# Patient Record
Sex: MALE | Race: WHITE | Employment: FULL TIME | URBAN - METROPOLITAN AREA
[De-identification: names, ages, dates, MRNs, and addresses within clinical notes are randomized per-mention and may not be internally consistent; named-entity substitution may affect disease eponyms.]

---

## 2017-10-05 ENCOUNTER — OFFICE VISIT (OUTPATIENT)
Dept: INTERNAL MEDICINE CLINIC | Age: 52
End: 2017-10-05

## 2017-10-05 VITALS
HEIGHT: 72 IN | OXYGEN SATURATION: 98 % | SYSTOLIC BLOOD PRESSURE: 126 MMHG | WEIGHT: 290 LBS | DIASTOLIC BLOOD PRESSURE: 90 MMHG | HEART RATE: 76 BPM | BODY MASS INDEX: 39.28 KG/M2 | RESPIRATION RATE: 16 BRPM

## 2017-10-05 DIAGNOSIS — R53.82 CHRONIC FATIGUE: ICD-10-CM

## 2017-10-05 DIAGNOSIS — Z23 ENCOUNTER FOR IMMUNIZATION: ICD-10-CM

## 2017-10-05 DIAGNOSIS — Z12.5 PROSTATE CANCER SCREENING: Primary | ICD-10-CM

## 2017-10-05 DIAGNOSIS — F32.3 SEVERE MAJOR DEPRESSION WITH PSYCHOTIC FEATURES (HCC): ICD-10-CM

## 2017-10-05 DIAGNOSIS — Z12.11 COLON CANCER SCREENING: ICD-10-CM

## 2017-10-05 DIAGNOSIS — E66.9 OBESITY (BMI 35.0-39.9 WITHOUT COMORBIDITY): ICD-10-CM

## 2017-10-05 PROBLEM — Z82.49 FAMILY HISTORY OF HEART DISEASE: Status: ACTIVE | Noted: 2017-10-05

## 2017-10-05 RX ORDER — CLONAZEPAM 1 MG/1
0.5 TABLET ORAL AS NEEDED
COMMUNITY
End: 2019-06-06 | Stop reason: ALTCHOICE

## 2017-10-05 RX ORDER — QUETIAPINE FUMARATE 50 MG/1
50 TABLET, EXTENDED RELEASE ORAL
COMMUNITY
Start: 2017-09-21 | End: 2019-06-06 | Stop reason: ALTCHOICE

## 2017-10-05 NOTE — MR AVS SNAPSHOT
Visit Information Date & Time Provider Department Dept. Phone Encounter #  
 10/5/2017  2:15 PM Gisela Tomas, [address] [facility] [phone] [id number] Your Appointments 1/17/2018  9:20 AM  
COMPLETE 40 with Kaylen Gutierrez MD  
Formerly [facility] Appt Note: cpe; cpe  
 [place] [address] [facility] [address]  
[facility] [address] [facility] [address] [facility] [address] Upcoming Health Maintenance Date Due Pneumococcal 19-64 Medium Risk (1 of 1 - PPSV23) 10/1/1983 FOBT Q 1 YEAR AGE 50-75 10/1/2014 INFLUENZA AGE 9 TO ADULT 8/1/2017 DTaP/Tdap/Td series (2 - Td) 5/1/2023 Allergies as of 10/5/2017  Review Complete On: 10/5/2017 By: Natalie Rosas LPN No Known Allergies Current Immunizations  Reviewed on 10/26/2012 Name Date Influenza Vaccine 10/12/2016 Influenza Vaccine (Quad) PF  Incomplete Influenza Vaccine Split 10/26/2012 Tdap 5/1/2013 Not reviewed this visit You Were Diagnosed With   
  
 Codes Comments Prostate cancer screening    -  Primary ICD-10-CM: Z12.5 ICD-9-CM: V76.44 Chronic fatigue     ICD-10-CM: R53.82 
ICD-9-CM: 780.79 Obesity (BMI 35.0-39.9 without comorbidity)     ICD-10-CM: J64.4 ICD-9-CM: 278.00 Severe major depression with psychotic features ([facility] [address]     ICD-10-CM: F32.3 ICD-9-CM: 296.24 Colon cancer screening     ICD-10-CM: Z12.11 ICD-9-CM: V76.51 Encounter for immunization     ICD-10-CM: K78 ICD-9-CM: V03.89 Vitals BP Pulse Resp Height(growth percentile) Weight(growth percentile) SpO2  
 126/90 76 16 6' (1.829 m) 290 lb (131.5 kg) 98% BMI Smoking Status 39.33 kg/m2 Never Smoker Vitals History BMI and BSA Data Body Mass Index Body Surface Area  
 39.33 kg/m 2 2.58 m 2 Preferred Pharmacy Pharmacy Name Phone [id number] Priya Deluca, [address] [facility] [phone] Your Updated Medication List  
  
   
This list is accurate as of: 10/5/17  3:09 PM.  Always use your most recent med list.  
  
  
  
  
 * buPROPion  mg tablet Commonly known as:  Kajal Failing Take 300 mg by mouth every morning. * buPROPion  mg XL tablet Commonly known as:  Kajal Failing Indications: ANXIETY WITH DEPRESSION  
  
 clonazePAM 1 mg tablet Commonly known as:  Elta Halsted Take  by mouth two (2) times a day. QUEtiapine SR 50 mg sr tablet Commonly known as:  SEROquel XR  
  
 venlafaxine 225 mg Tr24 TAKE 1 TAB DAILY ,INSTR:PLEASE ASK PT TO CALL TO SCHEDULE FOLLOW-UP. * Notice: This list has 2 medication(s) that are the same as other medications prescribed for you. Read the directions carefully, and ask your doctor or other care provider to review them with you. We Performed the Following CBC WITH AUTOMATED DIFF [17607 CPT(R)] INFLUENZA VIRUS VAC QUAD,SPLIT,PRESV FREE SYRINGE IM [id number] CPT(R)] LIPID PANEL [28668 CPT(R)] METABOLIC PANEL, COMPREHENSIVE [28405 CPT(R)] PSA SCREENING (SCREENING) [ [facility]] REFERRAL TO GASTROENTEROLOGY [YQN92 Custom] REFERRAL TO SLEEP STUDIES [REF99 Custom] TSH 3RD GENERATION [97502 CPT(R)] Referral Information Referral ID Referred By Referred To  
  
 [id number] Gasper Gibbs MD   
   [address] [place] [address] Phone: [phone] Fax: [phone] Visits Status Start Date End Date 1 New Request 10/5/17 10/5/18 If your referral has a status of pending review or denied, additional information will be sent to support the outcome of this decision. Referral ID Referred By Referred To  
 [id number] KELLY, [id number] [facility]  
   [address] [facility] [address], [address] Visits Status Start Date End Date 1 New Request 10/5/17 10/5/18 If your referral has a status of pending review or denied, additional information will be sent to support the outcome of this decision. Introducing [facility]! Dear Jabari Rudd: Thank you for requesting a [employer] account. Our records indicate that you already have an active [employer] account. You can access your account anytime at [email or web address] Did you know that you can access your hospital and ER discharge instructions at any time in [employer]? You can also review all of your test results from your hospital stay or ER visit. Additional Information If you have questions, please visit the Frequently Asked Questions section of the [employer] website at [email or web address] Remember, [employer] is NOT to be used for urgent needs. For medical emergencies, dial 911. Now available from your iPhone and Android! Please provide this summary of care documentation to your next provider. Your primary care clinician is listed as LIT CRANDALL. If you have any questions after today's visit, please call [phone].

## 2017-10-05 NOTE — PROGRESS NOTES
Reviewed record in preparation for visit and have obtained necessary documentation. Coordination of Care Questions    1. Have you been to the ER, urgent care clinic since your last visit? No       Hospitalized since your last visit? No    2. Have you seen or consulted any other health care providers outside of the [address] [place] [address] since your last visit? Include any pap smears or colon screening.  Yes

## 2017-10-05 NOTE — PROGRESS NOTES
No chief complaint on file. Nany Ramos is here for complete health maintenance physical exam and screening. he does have other concerns. Fatigue no energy  This is a 47year-old white male who is usually a patient of Dr. Damaris Shoemaker. The patient's wife asked if I would see him. He does not seem to communicate well with female physicians, so she wanted me to see him as a male physician. He really did not complain a lot and you had to get a lot out of him. The wife notes that he has had this very serious depression. He has had several hospital stays. He has been on high dose medication. He is now seeing a [facility] psychiatrist, who has been tapering his Seroquel. He still has no energy. He sleeps a lot and is tired a lot during the day. He denies insomnia. He says he goes to bed around 11:00 and wakes up early. He does feel tired during the day, but he does not really nap and he does not fall asleep while driving. He is not sure if he snores. He has gained 30 pounds over the last two to three years due to probably poor diet and inactivity. He has a family history of heart disease and with his obesity we are concerned about his cardiac risks. Health maintenance hx includes:  Exercise: not active.   Form of exercise: not much   Diet: not attempting to follow a low fat, low cholesterol diet, not attempting to follow a low sodium diet, nonsmoker, alcohol intake minimal  unemployed  Cancer screening:    Colon cancer screening:  Last Colonoscopy: never Prostate cancer screening: PSA and/or RUBIO:   2014  Lab Results   Component Value Date/Time    Prostate Specific Ag 0.5 11/14/2014 08:05 AM          Lab Results   Component Value Date/Time    Cholesterol, total 154 10/21/2016 01:26 PM    HDL Cholesterol 51 10/21/2016 01:26 PM    LDL, calculated 85 10/21/2016 01:26 PM    VLDL, calculated 18 10/21/2016 01:26 PM    Triglyceride 91 10/21/2016 01:26 PM    CHOL/HDL Ratio 3.0 04/18/2014 04:52 AM       Lab Results   Component Value Date/Time    Glucose 89 10/21/2016 01:26 PM    Glucose 109 04/18/2014 04:52 AM         Immunizations:     Immunization History   Administered Date(s) Administered    Influenza Vaccine 10/12/2016    Influenza Vaccine (Quad) PF 10/05/2017    Influenza Vaccine Split 10/26/2012    Tdap 05/01/2013      Immunization status: up to date and documented. Reviewed zostavax age 60     Social History     Social History    Marital status:      Spouse name: N/A    Number of children: N/A    Years of education: N/A     Occupational History    Not on file. Social History Main Topics    Smoking status: Never Smoker    Smokeless tobacco: Never Used    Alcohol use 1.0 oz/week     2 Standard drinks or equivalent per week    Drug use: No    Sexual activity: Yes     Partners: Female     Other Topics Concern    Not on file     Social History Narrative     Past Surgical History:   Procedure Laterality Date    HX HEENT  1990's    right ear explor. secondary to hearing loss     Family History   Problem Relation Age of Onset    Bipolar Disorder Brother      Current Outpatient Prescriptions on File Prior to Visit   Medication Sig Dispense Refill    buPROPion XL (WELLBUTRIN XL) 300 mg XL tablet Indications: ANXIETY WITH DEPRESSION      venlafaxine 225 mg tr24 TAKE 1 TAB DAILY ,INSTR:PLEASE ASK PT TO CALL TO SCHEDULE FOLLOW-UP.  2    buPROPion XL (WELLBUTRIN XL) 150 mg tablet Take 300 mg by mouth every morning. No current facility-administered medications on file prior to visit. .  Vitals:    10/05/17 1435 10/05/17 1450   BP: 142/90 126/90   Pulse: 76    Resp: 16    SpO2: 98%    Weight: 290 lb (131.5 kg)    Height: 6' (1.829 m)      The physical exam is generally normal. He appears withdrawn and oriented x 3, pleasant and cooperative. Vitals as noted. ENT normal, neck supple and free of adenopathy, or masses. No thyromegaly or carotid bruits.  Cranial nerves and fundi normal. Lungs are clear to auscultation. Heart sounds are normal, no murmurs, clicks, gallops or rubs. Abdomen is soft, no tenderness, masses or organomegaly. Extremities, peripheral pulses and reflexes are normal.  . Rectal: PROSTATE EXAM: smooth and symmetric without nodules or tenderness, difficult to tell. . Skin is normal without rashes or suspicious lesions. Diagnoses and all orders for this visit:    1. Prostate cancer screening  -     PSA SCREENING (SCREENING) ()  -     CBC WITH AUTOMATED DIFF  -     LIPID PANEL  -     METABOLIC PANEL, COMPREHENSIVE  -     TSH 3RD GENERATION    2. Chronic fatigue  -     REFERRAL TO SLEEP STUDIES    3. Obesity (BMI 35.0-39.9 without comorbidity)    4. Severe major depression with psychotic features ([facility] [address]    5. Colon cancer screening  -     [facility]    6. Encounter for immunization  -     Influenza virus vaccine (QUADRIVALENT PRES FREE SYRINGE) IM ([id number])              1. Chronic fatigue  Drug related likely and depression related  - REFERRAL TO SLEEP STUDIES    2. Obesity (BMI 35.0-39.9 without comorbidity)  Strong advise diet exercise  DASH diet advised and information given for lifestyle adjustment  May be able to lower BP 10 points with this diet      3. Prostate cancer screening    - PSA SCREENING (SCREENING) ()  - CBC WITH AUTOMATED DIFF  - LIPID PANEL  - METABOLIC PANEL, COMPREHENSIVE  - TSH 3RD GENERATION    4. Severe major depression with psychotic features [facility])  Per psych    5. Colon cancer screening  Needs to do  - [facility]    6. Encounter for immunization    - Influenza virus vaccine (QUADRIVALENT PRES FREE SYRINGE) IM ([id number])        Hypertension (elevated blood pressure)  - My Recommendations  -Purchase a blood pressure cuff that goes around your upper arm and check blood pressure at least 3 times per week. Write down your numbers for review. Check blood pressure in the morning and evening.   Rest for 5 minutes with feet elevated and arm resting on a table (at mid-chest level) when checking blood pressure  -Exercise 30-60 minutes most days of the week, preferably with a mix of cardiovascular and strength training. Exercise can improve blood pressure, even if you do not lose weight!  -Limit alcohol intake to less than 2-3 drinks daily   -Avoid tobacco products  -Avoid caffeine, energy drinks, stimulants  -DASH diet - includes fruits, vegetables, fiber, and less than 2000 mg sodium (salt) daily. Try to eat less than 6716-9698 calories daily.    Limit fat intake.    -Avoid non-steroidal inflammatory medications (NSAIDS) such as ibuprofen, advil, motrin, aleve, and naproxyn as these may increase blood pressure if used long-term  -Avoid OTC decongestants such as pseudopherine, phenylephrine, Afrin

## 2017-12-29 LAB
ALBUMIN SERPL-MCNC: 4.5 G/DL (ref 3.5–5.5)
ALBUMIN/GLOB SERPL: 1.5 {RATIO} (ref 1.2–2.2)
ALP SERPL-CCNC: 68 IU/L (ref 39–117)
ALT SERPL-CCNC: 52 IU/L (ref 0–44)
AST SERPL-CCNC: 32 IU/L (ref 0–40)
BASOPHILS # BLD AUTO: 0 X10E3/UL (ref 0–0.2)
BASOPHILS NFR BLD AUTO: 0 %
BILIRUB SERPL-MCNC: 0.6 MG/DL (ref 0–1.2)
BUN SERPL-MCNC: 10 MG/DL (ref 6–24)
BUN/CREAT SERPL: 11 (ref 9–20)
CALCIUM SERPL-MCNC: 9.5 MG/DL (ref 8.7–10.2)
CHLORIDE SERPL-SCNC: 102 MMOL/L (ref 96–106)
CHOLEST SERPL-MCNC: 126 MG/DL (ref 100–199)
CO2 SERPL-SCNC: 27 MMOL/L (ref 18–29)
CREAT SERPL-MCNC: 0.88 MG/DL (ref 0.76–1.27)
EOSINOPHIL # BLD AUTO: 0.1 X10E3/UL (ref 0–0.4)
EOSINOPHIL NFR BLD AUTO: 3 %
ERYTHROCYTE [DISTWIDTH] IN BLOOD BY AUTOMATED COUNT: 14.4 % (ref 12.3–15.4)
GLOBULIN SER CALC-MCNC: 3 G/DL (ref 1.5–4.5)
GLUCOSE SERPL-MCNC: 101 MG/DL (ref 65–99)
HCT VFR BLD AUTO: 45 % (ref 37.5–51)
HDLC SERPL-MCNC: 42 MG/DL
HGB BLD-MCNC: 15.8 G/DL (ref 13–17.7)
IMM GRANULOCYTES # BLD: 0 X10E3/UL (ref 0–0.1)
IMM GRANULOCYTES NFR BLD: 0 %
INTERPRETATION, 910389: NORMAL
LDLC SERPL CALC-MCNC: 69 MG/DL (ref 0–99)
LYMPHOCYTES # BLD AUTO: 2.1 X10E3/UL (ref 0.7–3.1)
LYMPHOCYTES NFR BLD AUTO: 40 %
MCH RBC QN AUTO: 32.6 PG (ref 26.6–33)
MCHC RBC AUTO-ENTMCNC: 35.1 G/DL (ref 31.5–35.7)
MCV RBC AUTO: 93 FL (ref 79–97)
MONOCYTES # BLD AUTO: 0.5 X10E3/UL (ref 0.1–0.9)
MONOCYTES NFR BLD AUTO: 10 %
NEUTROPHILS # BLD AUTO: 2.4 X10E3/UL (ref 1.4–7)
NEUTROPHILS NFR BLD AUTO: 47 %
PLATELET # BLD AUTO: 210 X10E3/UL (ref 150–379)
POTASSIUM SERPL-SCNC: 4.4 MMOL/L (ref 3.5–5.2)
PROT SERPL-MCNC: 7.5 G/DL (ref 6–8.5)
PSA SERPL-MCNC: 0.4 NG/ML (ref 0–4)
RBC # BLD AUTO: 4.85 X10E6/UL (ref 4.14–5.8)
SODIUM SERPL-SCNC: 143 MMOL/L (ref 134–144)
TRIGL SERPL-MCNC: 74 MG/DL (ref 0–149)
TSH SERPL DL<=0.005 MIU/L-ACNC: 1.26 UIU/ML (ref 0.45–4.5)
VLDLC SERPL CALC-MCNC: 15 MG/DL (ref 5–40)
WBC # BLD AUTO: 5.2 X10E3/UL (ref 3.4–10.8)

## 2018-01-04 NOTE — PROGRESS NOTES
Writer spoke with patient and informed him Per Dr Robe Correia slight elevation liver enzymes most likely related to weight and fatty liver, stable otherwise.  Patient states understanding

## 2018-12-17 ENCOUNTER — OFFICE VISIT (OUTPATIENT)
Dept: INTERNAL MEDICINE CLINIC | Age: 53
End: 2018-12-17

## 2018-12-17 VITALS
TEMPERATURE: 97 F | WEIGHT: 303.6 LBS | HEART RATE: 73 BPM | DIASTOLIC BLOOD PRESSURE: 90 MMHG | RESPIRATION RATE: 18 BRPM | SYSTOLIC BLOOD PRESSURE: 135 MMHG | BODY MASS INDEX: 41.12 KG/M2 | HEIGHT: 72 IN | OXYGEN SATURATION: 98 %

## 2018-12-17 DIAGNOSIS — I10 HTN, GOAL BELOW 130/80: ICD-10-CM

## 2018-12-17 DIAGNOSIS — R53.83 FATIGUE DUE TO DEPRESSION: ICD-10-CM

## 2018-12-17 DIAGNOSIS — F32.A FATIGUE DUE TO DEPRESSION: ICD-10-CM

## 2018-12-17 DIAGNOSIS — Z82.49 FAMILY HISTORY OF HEART DISEASE: ICD-10-CM

## 2018-12-17 DIAGNOSIS — F32.3 SEVERE MAJOR DEPRESSION WITH PSYCHOTIC FEATURES (HCC): Primary | ICD-10-CM

## 2018-12-17 DIAGNOSIS — Z12.5 ENCOUNTER FOR PROSTATE CANCER SCREENING: ICD-10-CM

## 2018-12-17 DIAGNOSIS — E66.9 OBESITY (BMI 35.0-39.9 WITHOUT COMORBIDITY): ICD-10-CM

## 2018-12-17 PROBLEM — E66.01 OBESITY, MORBID (HCC): Status: ACTIVE | Noted: 2018-12-17

## 2018-12-17 NOTE — PROGRESS NOTES
1. Have you been to the ER, urgent care clinic since your last visit? Hospitalized since your last visit?yes. Admitted for Depression- [facility]    2. Have you seen or consulted any other health care providers outside of the [address] [place] [address] since your last visit? Include any pap smears or colon screening.   Dr. Susan Edwards through [facility]

## 2018-12-17 NOTE — PROGRESS NOTES
Chief Complaint   Patient presents with    Complete Physical     Chief Complaint   Patient presents with    Complete Physical     Jarred LUIS Veliz is here for complete health maintenance physical exam and screening. he does have other concerns. Fatigue no energy  This is a 47year-old white male who is usually a patient of Dr. Silvio Rider. The patient's wife asked if I would see him. He does not seem to communicate well with female physicians, so she wanted me to see him as a male physician. He really did not complain a lot and you had to get a lot out of him. .   Was hospitalized in 2/2018    After stopping medsHe still has no energy. He sleeps a lot and is tired a lot during the day. He denies insomnia. He says he goes to bed around 11:00 and wakes up early. He does feel tired during the day, but he does not really nap and he does not fall asleep while driving. He is not sure if he snores. He has gained 30 pounds over the last two to three years due to probably poor diet and inactivity. He has a family history of heart disease and with his obesity we are concerned about his cardiac risks. Health maintenance hx includes:  Exercise: not active.   Form of exercise: not much   Diet: not attempting to follow a low fat, low cholesterol diet, not attempting to follow a low sodium diet, nonsmoker, alcohol intake minimal  unemployed  Cancer screening:    Colon cancer screening:  Last Colonoscopy:2018 Prostate cancer screening: PSA   Lab Results   Component Value Date/Time    Prostate Specific Ag 0.4 12/28/2017 08:21 AM    Prostate Specific Ag 0.5 11/14/2014 08:05 AM          Lab Results   Component Value Date/Time    Cholesterol, total 126 12/28/2017 08:21 AM    HDL Cholesterol 42 12/28/2017 08:21 AM    LDL, calculated 69 12/28/2017 08:21 AM    VLDL, calculated 15 12/28/2017 08:21 AM    Triglyceride 74 12/28/2017 08:21 AM    CHOL/HDL Ratio 3.0 04/18/2014 04:52 AM       Lab Results   Component Value Date/Time    Glucose 101 (H) 12/28/2017 08:21 AM    Glucose 109 (H) 04/18/2014 04:52 AM         Immunizations:     Immunization History   Administered Date(s) Administered    Influenza Vaccine 10/12/2016    Influenza Vaccine (Quad) PF 10/05/2017    Influenza Vaccine Split 10/26/2012    Tdap 05/01/2013      Immunization status: up to date and documented. worried low T noted  on present drugs testosterone always low and with obesity is low     Social History     Socioeconomic History    Marital status:      Spouse name: Not on file    Number of children: Not on file    Years of education: Not on file    Highest education level: Not on file   Social Needs    Financial resource strain: Not on file    Food insecurity - worry: Not on file    Food insecurity - inability: Not on file   [employer] needs - medical: Not on file   [employer] needs - non-medical: Not on file   Occupational History    Not on file   Tobacco Use    Smoking status: Never Smoker    Smokeless tobacco: Never Used   Substance and Sexual Activity    Alcohol use: Yes     Alcohol/week: 1.0 oz     Types: 2 Standard drinks or equivalent per week    Drug use: No    Sexual activity: Yes     Partners: Female   Other Topics Concern    Not on file   Social History Narrative    Not on file     Past Surgical History:   Procedure Laterality Date    HX HEENT  1990's    right ear explor. secondary to hearing loss     Family History   Problem Relation Age of Onset    Bipolar Disorder Brother      Current Outpatient Medications on File Prior to Visit   Medication Sig Dispense Refill    clonazePAM (KLONOPIN) 1 mg tablet Take 0.5 mg by mouth as needed.  QUEtiapine SR (SEROQUEL XR) 50 mg sr tablet Take 50 mg by mouth nightly.       buPROPion XL (WELLBUTRIN XL) 300 mg XL tablet Indications: ANXIETY WITH DEPRESSION      venlafaxine 225 mg tr24 TAKE 1 TAB DAILY ,INSTR:PLEASE ASK PT TO CALL TO SCHEDULE FOLLOW-UP.  2    buPROPion XL (WELLBUTRIN XL) 150 mg tablet Take 300 mg by mouth every morning. No current facility-administered medications on file prior to visit. .  Vitals:    12/17/18 1327 12/17/18 1354   BP: (!) 151/98 135/90   Pulse: 73    Resp: 18    Temp: 97 °F (36.1 °C)    TempSrc: Oral    SpO2: 98%    Weight: 303 lb 9.6 oz (137.7 kg)    Height: 6' (1.829 m)      The physical exam is generally normal. He appears withdrawn and oriented x 3, pleasant and cooperative. Vitals as noted. ENT normal, neck supple and free of adenopathy, or masses. No thyromegaly or carotid bruits. Cranial nerves and fundi normal. Lungs are clear to auscultation. Heart sounds are normal, no murmurs, clicks, gallops or rubs. Abdomen is soft, no tenderness, masses or organomegaly. Extremities, peripheral pulses and reflexes are normal.  . Rectal:   Deferred due to his size  Skin is normal without rashes or suspicious lesions. Diagnoses and all orders for this visit:    1. Severe major depression with psychotic features ([facility] [address]    2. Encounter for prostate cancer screening  -     PSA, DIAGNOSTIC (PROSTATE SPECIFIC AG)    3. Obesity (BMI 35.0-39.9 without comorbidity)  -     TSH 3RD GENERATION  -     SLEEP MEDICINE REFERRAL    4. Family history of heart disease    5. HTN, goal below 130/80  -     CBC WITH AUTOMATED DIFF  -     LIPID PANEL  -     METABOLIC PANEL, COMPREHENSIVE  -     SLEEP MEDICINE REFERRAL    6. Fatigue due to depression              1. Chronic fatigue  Drug related likely and depression related  - REFERRAL TO SLEEP STUDIES    2. Obesity (BMI 35.0-39.9 without comorbidity)  Strong advise diet exercise  DASH diet advised and information given for lifestyle adjustment  May be able to lower BP 10 points with this diet      3. Prostate cancer screening    - PSA SCREENING (SCREENING) ()  - CBC WITH AUTOMATED DIFF  - LIPID PANEL  - METABOLIC PANEL, COMPREHENSIVE  - TSH 3RD GENERATION    4.  Severe major depression with psychotic features [facility])  Per psych    6. Encounter for immunization    - Influenza virus vaccine (QUADRIVALENT PRES FREE SYRINGE) IM ([id number])      -Limit alcohol intake to less than 2-3 drinks daily   -Avoid tobacco products  -Avoid caffeine, energy drinks, stimulants  -DASH diet - includes fruits, vegetables, fiber, and less than 2000 mg sodium (salt) daily. Try to eat less than 7737-8955 calories daily.    Limit fat intake.    -Avoid non-steroidal inflammatory medications (NSAIDS) such as ibuprofen, advil, motrin, aleve, and naproxyn as these may increase blood pressure if used long-term  -Avoid OTC decongestants such as pseudopherine, phenylephrine, Afrin      RX for shingrix given  See 6 month on BP  After evfal for apnea

## 2018-12-18 LAB
ALBUMIN SERPL-MCNC: 4.6 G/DL (ref 3.5–5.5)
ALBUMIN/GLOB SERPL: 1.5 {RATIO} (ref 1.2–2.2)
ALP SERPL-CCNC: 88 IU/L (ref 39–117)
ALT SERPL-CCNC: 95 IU/L (ref 0–44)
AST SERPL-CCNC: 49 IU/L (ref 0–40)
BASOPHILS # BLD AUTO: 0.1 X10E3/UL (ref 0–0.2)
BASOPHILS NFR BLD AUTO: 1 %
BILIRUB SERPL-MCNC: 0.4 MG/DL (ref 0–1.2)
BUN SERPL-MCNC: 9 MG/DL (ref 6–24)
BUN/CREAT SERPL: 10 (ref 9–20)
CALCIUM SERPL-MCNC: 9.3 MG/DL (ref 8.7–10.2)
CHLORIDE SERPL-SCNC: 103 MMOL/L (ref 96–106)
CHOLEST SERPL-MCNC: 147 MG/DL (ref 100–199)
CO2 SERPL-SCNC: 24 MMOL/L (ref 20–29)
CREAT SERPL-MCNC: 0.93 MG/DL (ref 0.76–1.27)
EOSINOPHIL # BLD AUTO: 0.1 X10E3/UL (ref 0–0.4)
EOSINOPHIL NFR BLD AUTO: 2 %
ERYTHROCYTE [DISTWIDTH] IN BLOOD BY AUTOMATED COUNT: 13.5 % (ref 12.3–15.4)
GLOBULIN SER CALC-MCNC: 3 G/DL (ref 1.5–4.5)
GLUCOSE SERPL-MCNC: 153 MG/DL (ref 65–99)
HCT VFR BLD AUTO: 43 % (ref 37.5–51)
HDLC SERPL-MCNC: 45 MG/DL
HGB BLD-MCNC: 14.9 G/DL (ref 13–17.7)
IMM GRANULOCYTES # BLD: 0 X10E3/UL (ref 0–0.1)
IMM GRANULOCYTES NFR BLD: 0 %
INTERPRETATION, 910389: NORMAL
LDLC SERPL CALC-MCNC: 88 MG/DL (ref 0–99)
LYMPHOCYTES # BLD AUTO: 2.1 X10E3/UL (ref 0.7–3.1)
LYMPHOCYTES NFR BLD AUTO: 37 %
MCH RBC QN AUTO: 32.5 PG (ref 26.6–33)
MCHC RBC AUTO-ENTMCNC: 34.7 G/DL (ref 31.5–35.7)
MCV RBC AUTO: 94 FL (ref 79–97)
MONOCYTES # BLD AUTO: 0.6 X10E3/UL (ref 0.1–0.9)
MONOCYTES NFR BLD AUTO: 10 %
NEUTROPHILS # BLD AUTO: 2.8 X10E3/UL (ref 1.4–7)
NEUTROPHILS NFR BLD AUTO: 50 %
PLATELET # BLD AUTO: 208 X10E3/UL (ref 150–379)
POTASSIUM SERPL-SCNC: 4.5 MMOL/L (ref 3.5–5.2)
PROT SERPL-MCNC: 7.6 G/DL (ref 6–8.5)
PSA SERPL-MCNC: 0.4 NG/ML (ref 0–4)
RBC # BLD AUTO: 4.58 X10E6/UL (ref 4.14–5.8)
SODIUM SERPL-SCNC: 141 MMOL/L (ref 134–144)
TRIGL SERPL-MCNC: 72 MG/DL (ref 0–149)
TSH SERPL DL<=0.005 MIU/L-ACNC: 0.76 UIU/ML (ref 0.45–4.5)
VLDLC SERPL CALC-MCNC: 14 MG/DL (ref 5–40)
WBC # BLD AUTO: 5.7 X10E3/UL (ref 3.4–10.8)

## 2018-12-21 DIAGNOSIS — R94.5 ABNORMAL LIVER FUNCTION: Primary | ICD-10-CM

## 2018-12-21 NOTE — PROGRESS NOTES
Abnormal liver enzymes likely fatty liver    However I will order a n ultrasound which he needs to do

## 2019-01-14 ENCOUNTER — HOSPITAL ENCOUNTER (OUTPATIENT)
Dept: ULTRASOUND IMAGING | Age: 54
Discharge: HOME | End: 2019-01-14
Attending: INTERNAL MEDICINE
Payer: COMMERCIAL

## 2019-01-14 DIAGNOSIS — R94.5 ABNORMAL LIVER FUNCTION: ICD-10-CM

## 2019-01-14 PROCEDURE — 76705 ECHO EXAM OF ABDOMEN: CPT

## 2019-01-17 ENCOUNTER — OFFICE VISIT (OUTPATIENT)
Dept: PRIMARY CARE CLINIC | Age: 54
End: 2019-01-17

## 2019-01-17 VITALS
OXYGEN SATURATION: 99 % | RESPIRATION RATE: 20 BRPM | SYSTOLIC BLOOD PRESSURE: 136 MMHG | HEIGHT: 72 IN | HEART RATE: 69 BPM | BODY MASS INDEX: 40.12 KG/M2 | DIASTOLIC BLOOD PRESSURE: 86 MMHG | WEIGHT: 296.2 LBS | TEMPERATURE: 98 F

## 2019-01-17 DIAGNOSIS — E66.01 MORBIDLY OBESE (HCC): ICD-10-CM

## 2019-01-17 DIAGNOSIS — F51.01 PRIMARY INSOMNIA: ICD-10-CM

## 2019-01-17 DIAGNOSIS — R73.01 ELEVATED FASTING BLOOD SUGAR: ICD-10-CM

## 2019-01-17 DIAGNOSIS — F32.3 SEVERE MAJOR DEPRESSION WITH PSYCHOTIC FEATURES (HCC): ICD-10-CM

## 2019-01-17 DIAGNOSIS — R06.83 SNORES: ICD-10-CM

## 2019-01-17 DIAGNOSIS — E11.9 NEW ONSET TYPE 2 DIABETES MELLITUS (HCC): ICD-10-CM

## 2019-01-17 DIAGNOSIS — K76.0 HEPATIC STEATOSIS: Primary | ICD-10-CM

## 2019-01-17 PROBLEM — E66.9 OBESITY (BMI 35.0-39.9 WITHOUT COMORBIDITY): Status: RESOLVED | Noted: 2017-10-05 | Resolved: 2019-01-17

## 2019-01-17 LAB — HBA1C MFR BLD HPLC: 7.2 %

## 2019-01-17 RX ORDER — METFORMIN HYDROCHLORIDE 500 MG/1
500 TABLET ORAL 2 TIMES DAILY WITH MEALS
Qty: 180 TAB | Refills: 0 | Status: SHIPPED | OUTPATIENT
Start: 2019-01-17 | End: 2019-04-16 | Stop reason: SDUPTHER

## 2019-01-17 RX ORDER — DULOXETIN HYDROCHLORIDE 60 MG/1
60 CAPSULE, DELAYED RELEASE ORAL DAILY
COMMUNITY

## 2019-01-17 NOTE — PROGRESS NOTES
Visit Vitals /88 (BP 1 Location: Left arm, BP Patient Position: Sitting) Pulse 69 Temp 98 °F (36.7 °C) (Oral) Resp 20 Ht 6' (1.829 m) Wt 296 lb 3.2 oz (134.4 kg) SpO2 99% BMI 40.17 kg/m² Chief Complaint Patient presents with Bob Ban Establish Care Changing PCP d/t accessability  Labs Would like to dicuss recent labs, and dx of Fatty Liver Disease  Leg Swelling Moderate  Nail Problem R foot only 1. Have you been to the ER, urgent care clinic since your last visit? Hospitalized since your last visit? Denies 2. Have you seen or consulted any other health care providers outside of the [address] [place] [address] since your last visit? Include any pap smears or colon screening. Denies

## 2019-01-17 NOTE — PROGRESS NOTES
Written by Pauline Martin, as dictated by Dr. Alek Medley MD. 
[address] [place] [address] Mavis Jay is a 46 y.o. male. HPI The patient comes in today to establish care and to discuss labs and imaging which were ordered by Dr. Diana Cunha (), his previous PCP. Labs were draw on 12/17: Glucose was high at 153, AST high at 49, and ALT high at 95. CBC, lipid panel, PSA, and TSH were normal. Patient notes that his triglycerides are normally high, but he has been eating healthy and is not on cholesterol medication. Patent notes that he ate during the day before his labs were drawn. He has no HX of diabetes or fatty liver disease. The patient believes his liver enzymes were high when he was in his 25s, but he notes that they were only high once. US ABD on 01/14 found: 1. Liver is diffusely increased in echogenicity which limits penetration with 2 small areas of focal sparing in the gallbladder fossa and findings may represent hepatic steatosis. There is no evidence of gallstones or ductal dilatation. He sees Dr. Rebeca Tidwell (psychiatry) and is taking venlafaxine 225 mg, Wellbutrin  mg, Seroquel  mg, Cymbalta 60 mg, and Klonopin 1 mg for depression. His Seroquel dosage is being decreased and he denies issues with this. The patient takes Klonopin as needed, but notes that he has not been taking it recently. He does not sleep well at night and has been told that he snores. Patient sometimes has headaches in the morning. He was told to have a sleep study, but did not go. He weighs 296 lbs today and has lost weight from 303 lbs on 12/17. Patient has been trying to lose weight. The patient notes that he eats pasta about once per week, pizza, and bread with most meals. BP is high today at 142/88, 136/86 on repeat. Denies HX of HTN and does not check his BP at home. He does not have a BP monitor at home. Patient has noticed BL leg swelling, but states that it is not bad today. He notes that the swelling is worse in the R leg than the L. The patient notes that he sits a lot and the swelling does not seem to be related to standing. Patient notes that he had asthma as a child, but grew out of it. He has some allergies, noting that he is especially allergic to cat dander and horses. He takes OTC Claritin. Denies constipation, diarrhea. Patient Active Problem List  
Diagnosis Code  Allergic rhinitis J30.9  Severe major depression with psychotic features ([facility]) F32.3  Family history of heart disease Z82.49  
 Obesity, morbid ([facility] [address] E66.01  
  
 
Current Outpatient Medications on File Prior to Visit Medication Sig Dispense Refill  DULoxetine (CYMBALTA) 60 mg capsule Take 60 mg by mouth daily.  clonazePAM (KLONOPIN) 1 mg tablet Take 0.5 mg by mouth as needed.  QUEtiapine SR (SEROQUEL XR) 50 mg sr tablet Take 50 mg by mouth nightly.  buPROPion XL (WELLBUTRIN XL) 150 mg tablet Take 300 mg by mouth every morning.  buPROPion XL (WELLBUTRIN XL) 300 mg XL tablet Indications: ANXIETY WITH DEPRESSION No current facility-administered medications on file prior to visit. Past Medical History:  
Diagnosis Date  Allergic rhinitis  Asthma   
 childhood & as adult allergic  Hearing loss in right ear   
 (30%) secondary to cyst  
 Narcolepsy 2008 Past Surgical History:  
Procedure Laterality Date  HX HEENT  1990's  
 right ear explor. secondary to hearing loss Family History Problem Relation Age of Onset  Bipolar Disorder Brother Social History Socioeconomic History  Marital status:  Spouse name: Not on file  Number of children: Not on file  Years of education: Not on file  Highest education level: Not on file Social Needs  Financial resource strain: Not on file  Food insecurity - worry: Not on file  Food insecurity - inability: Not on file  Transportation needs - medical: Not on file  Transportation needs - non-medical: Not on file Occupational History  Not on file Tobacco Use  Smoking status: Never Smoker  Smokeless tobacco: Never Used Substance and Sexual Activity  Alcohol use: Yes Alcohol/week: 1.0 oz Types: 2 Standard drinks or equivalent per week  Drug use: No  
 Sexual activity: Yes  
  Partners: Female Other Topics Concern  Not on file Social History Narrative  Not on file Office Visit on 12/17/2018 Component Date Value Ref Range Status  WBC 12/17/2018 5.7  3.4 - 10.8 x10E3/uL Final  
 RBC 12/17/2018 4.58  4.14 - 5.80 x10E6/uL Final  
 HGB 12/17/2018 14.9  13.0 - 17.7 g/dL Final  
 HCT 12/17/2018 43.0  37.5 - 51.0 % Final  
 MCV 12/17/2018 94  79 - 97 fL Final  
 MCH 12/17/2018 32.5  26.6 - 33.0 pg Final  
 MCHC 12/17/2018 34.7  31.5 - 35.7 g/dL Final  
 RDW 12/17/2018 13.5  12.3 - 15.4 % Final  
 PLATELET 81/28/7547 723  150 - 379 x10E3/uL Final  
 NEUTROPHILS 12/17/2018 50  Not Estab. % Final  
 Lymphocytes 12/17/2018 37  Not Estab. % Final  
 MONOCYTES 12/17/2018 10  Not Estab. % Final  
 EOSINOPHILS 12/17/2018 2  Not Estab. % Final  
 BASOPHILS 12/17/2018 1  Not Estab. % Final  
 ABS. NEUTROPHILS 12/17/2018 2.8  1.4 - 7.0 x10E3/uL Final  
 Abs Lymphocytes 12/17/2018 2.1  0.7 - 3.1 x10E3/uL Final  
 ABS. MONOCYTES 12/17/2018 0.6  0.1 - 0.9 x10E3/uL Final  
 ABS. EOSINOPHILS 12/17/2018 0.1  0.0 - 0.4 x10E3/uL Final  
 ABS. BASOPHILS 12/17/2018 0.1  0.0 - 0.2 x10E3/uL Final  
 IMMATURE GRANULOCYTES 12/17/2018 0  Not Estab. % Final  
 ABS. IMM. GRANS.  12/17/2018 0.0  0.0 - 0.1 x10E3/uL Final  
 Cholesterol, total 12/17/2018 147  100 - 199 mg/dL Final  
 Triglyceride 12/17/2018 72  0 - 149 mg/dL Final  
 HDL Cholesterol 12/17/2018 45  >39 mg/dL Final  
 VLDL, calculated 12/17/2018 14  5 - 40 mg/dL Final  
 LDL, calculated 12/17/2018 88  0 - 99 mg/dL Final  
  Glucose 12/17/2018 153* 65 - 99 mg/dL Final  
 BUN 12/17/2018 9  6 - 24 mg/dL Final  
 Creatinine 12/17/2018 0.93  0.76 - 1.27 mg/dL Final  
 GFR est non-AA 12/17/2018 93  >59 mL/min/1.73 Final  
 GFR est AA 12/17/2018 107  >59 mL/min/1.73 Final  
 BUN/Creatinine ratio 12/17/2018 10  9 - 20 Final  
 Sodium 12/17/2018 141  134 - 144 mmol/L Final  
 Potassium 12/17/2018 4.5  3.5 - 5.2 mmol/L Final  
 Chloride 12/17/2018 103  96 - 106 mmol/L Final  
 CO2 12/17/2018 24  20 - 29 mmol/L Final  
 Calcium 12/17/2018 9.3  8.7 - 10.2 mg/dL Final  
 Protein, total 12/17/2018 7.6  6.0 - 8.5 g/dL Final  
 Albumin 12/17/2018 4.6  3.5 - 5.5 g/dL Final  
 GLOBULIN, TOTAL 12/17/2018 3.0  1.5 - 4.5 g/dL Final  
 A-G Ratio 12/17/2018 1.5  1.2 - 2.2 Final  
 Bilirubin, total 12/17/2018 0.4  0.0 - 1.2 mg/dL Final  
 Alk. phosphatase 12/17/2018 88  39 - 117 IU/L Final  
 AST (SGOT) 12/17/2018 49* 0 - 40 IU/L Final  
 ALT (SGPT) 12/17/2018 95* 0 - 44 IU/L Final  
 Prostate Specific Ag 12/17/2018 0.4  0.0 - 4.0 ng/mL Final  
 TSH 12/17/2018 0.763  0.450 - 4.500 uIU/mL Final  
 
 
Review of Systems Constitutional: Negative for malaise/fatigue. HENT: Negative for congestion. Eyes: Negative for blurred vision and pain. Respiratory: Negative for cough and shortness of breath. Cardiovascular: Positive for leg swelling (BL, R more than L). Negative for chest pain and palpitations. Gastrointestinal: Negative for abdominal pain, constipation, diarrhea and heartburn. Genitourinary: Negative for frequency and urgency. Musculoskeletal: Negative for joint pain and myalgias. Neurological: Negative for dizziness, tingling, sensory change, weakness and headaches. Endo/Heme/Allergies: Positive for environmental allergies. Psychiatric/Behavioral: Positive for depression. Negative for memory loss and substance abuse. The patient has insomnia. Visit Vitals /88 (BP 1 Location: Left arm, BP Patient Position: Sitting) Pulse 69 Temp 98 °F (36.7 °C) (Oral) Resp 20 Ht 6' (1.829 m) Wt 296 lb 3.2 oz (134.4 kg) SpO2 99% BMI 40.17 kg/m² Physical Exam  
Constitutional: He is oriented to person, place, and time. He appears well-developed. No distress. Morbidly obese HENT:  
Right Ear: External ear normal.  
Left Ear: External ear normal.  
Eyes: Conjunctivae and EOM are normal. Right eye exhibits no discharge. Left eye exhibits no discharge. Neck: Normal range of motion. Neck supple. Cardiovascular: Normal rate, regular rhythm and normal heart sounds. Pulmonary/Chest: Effort normal and breath sounds normal. He has no wheezes. Abdominal: Soft. Bowel sounds are normal. There is no tenderness. Lymphadenopathy:  
  He has no cervical adenopathy. Neurological: He is alert and oriented to person, place, and time. Skin: He is not diaphoretic. Psychiatric: He has a normal mood and affect. His behavior is normal.  
Nursing note and vitals reviewed. ASSESSMENT and PLAN 
  ICD-10-CM ICD-9-CM 1. Hepatic steatosis K76.0 571.8 Discussed that he should exercise and lose weight by maintaining a healthy diet. 2. Elevated fasting blood sugar R73.01 790.21 POC HbA1c was 7.2%. 3. New onset type 2 diabetes mellitus ([facility] E11.9 250.00 metFORMIN (GLUCOPHAGE) 500 mg tablet sent to pharmacy. Metformin prescribed, which he should take with food BID. Potential side effects were discussed and he should let me know if he has diarrhea. He should make sure to drink plenty of water. Encouraged him to follow a low carbohydrate diet and exercise. He should not eat bread, pasta, rice, etc.  
4. Severe major depression with psychotic features ([facility] [address] F32.3 296.24 Followed by psychiatry and compliant on medication. Discussed that I would like him to come off of Seroquel. 5. Primary insomnia F51.01 307.42 Followed by psychiatry.  Referred to sleep medicine for sleep study. 6. Morbidly obese (HCC) E66.01 278.01 Commended on his weight loss. Encouraged him to follow a healthy diet and exercise. Discussed that metformin and Wellbutrin can help with weight loss. Discussed that Seroquel can contribute to weight gain. I recommend the patient download the Weight Watchers zoila to help track food consumption. The patient should not use their weekly points, as weekly points are for weight maintenance and the patient is trying to lose weight. The patient should eat plenty of fruits and vegetables. 7. Snores R06.83 786.09 SLEEP MEDICINE REFERRAL Referred to sleep medicine for at home sleep study. Based on his symptoms, he should get a sleep study as I am positive he has sleep apnea. He should check his BP at home or at the pharmacy. Patient should record his readings and send them to me via [employer]. He should make a 3 month appointment for a complete physical exam. 
 
This plan was reviewed with the patient and patient agrees. All questions were answered. This scribe documentation was reviewed by me and accurately reflects the examination and decisions made by me. This note will not be viewable in [address]

## 2019-02-01 ENCOUNTER — TELEPHONE (OUTPATIENT)
Dept: PRIMARY CARE CLINIC | Age: 54
End: 2019-02-01

## 2019-04-16 DIAGNOSIS — E11.9 NEW ONSET TYPE 2 DIABETES MELLITUS (HCC): ICD-10-CM

## 2019-04-16 RX ORDER — METFORMIN HYDROCHLORIDE 500 MG/1
500 TABLET ORAL 2 TIMES DAILY WITH MEALS
Qty: 180 TAB | Refills: 0 | Status: SHIPPED | OUTPATIENT
Start: 2019-04-16 | End: 2019-09-05 | Stop reason: SDUPTHER

## 2019-06-06 ENCOUNTER — OFFICE VISIT (OUTPATIENT)
Dept: PRIMARY CARE CLINIC | Age: 54
End: 2019-06-06

## 2019-06-06 VITALS
BODY MASS INDEX: 35.81 KG/M2 | DIASTOLIC BLOOD PRESSURE: 75 MMHG | HEIGHT: 72 IN | HEART RATE: 65 BPM | RESPIRATION RATE: 18 BRPM | SYSTOLIC BLOOD PRESSURE: 105 MMHG | TEMPERATURE: 98.1 F | WEIGHT: 264.4 LBS | OXYGEN SATURATION: 97 %

## 2019-06-06 DIAGNOSIS — E11.9 NEW ONSET TYPE 2 DIABETES MELLITUS (HCC): Primary | ICD-10-CM

## 2019-06-06 DIAGNOSIS — R20.0 NUMBNESS OF ANTERIOR THIGH: ICD-10-CM

## 2019-06-06 DIAGNOSIS — E66.9 OBESITY (BMI 30-39.9): ICD-10-CM

## 2019-06-06 DIAGNOSIS — F34.1 DYSTHYMIA: ICD-10-CM

## 2019-06-06 PROBLEM — E66.01 OBESITY, MORBID (HCC): Status: RESOLVED | Noted: 2018-12-17 | Resolved: 2019-06-06

## 2019-06-06 LAB
ALBUMIN UR QL STRIP: 30 MG/L
CREATININE, URINE POC: 300 MG/DL
MICROALBUMIN/CREAT RATIO POC: <30 MG/G

## 2019-06-06 NOTE — PROGRESS NOTES
Visit Vitals  /75 (BP 1 Location: Left arm, BP Patient Position: Sitting)   Pulse 65   Temp 98.1 °F (36.7 °C) (Oral)   Resp 18   Ht 6' (1.829 m)   Wt 264 lb 6.4 oz (119.9 kg)   SpO2 97%   BMI 35.86 kg/m²         Chief Complaint   Patient presents with    Follow Up Chronic Condition     Diabetes    Numbness     Right Thigh x 3-4 Weeks      ====Iqra Rain Invitation====    Patient was invited to [employer] on this date and given the information folder for review. Recommended appointment with Iqra Rain facilitator for ACP conversation regarding advance directives. [x] Yes  [] No  Referral sent to [facility] team member or Coordinator for follow-up    [] Yes  [x] No  Patient scheduled an appointment. Site of Referral: [facility]          1. Have you been to the ER, urgent care clinic since your last visit? Hospitalized since your last visit? May 2019- [address] 10 day Admission    2. Have you seen or consulted any other health care providers outside of the [address] [place] [address] since your last visit? Include any pap smears or colon screening.  See Above

## 2019-06-06 NOTE — PROGRESS NOTES
Written by Cristopher Sweeney, as dictated by Dr. Denzel Thomas MD.    Abby Willis is a 46 y.o. male. HPI  The patient presents today for a DM follow-up. Patient notes that he had cream and sugar in his coffee, but he has not eaten. Compliant on metformin 500 mg BID with meals. He notes that his bowel movements have increased in frequency since starting metformin and his stools are softer. Denies abdominal cramps, diarrhea, and constipation. He weighs 264 lbs today and has lost weight from 296 lbs on 01/17/2019. His lowest weight was 257 lbs. The patient believes his weight loss has stabilized some. Patient reports that he has been watching his diet and doing a small amount of exercise. The pt has been walking but plans to start playing tennis again. Denies lack of appetite. Pt is taking Cymbalta 60 mg BID and trazodone at night for sleep. He stopped taking Wellbutrin and Seroquel on his own. The pt notes that Seroquel caused him to fall asleep a lot during the day and caused weight gain. Denies feeling low, crying spells, insomnia, and anxiety. He reports that his depression is doing well now, but about 1 month ago he was admitted to [facility] for 10 days for depression. Followed by psychiatry. He has an area of numbness on his lateral R thigh, which started about 3 weeks ago. He believes he hit his leg on something as the area started with a dull pain that turned into numbness. His movement has not been impacted. The pt has seasonal allergies and takes OTC antihistamines as needed. Patient Active Problem List   Diagnosis Code    Allergic rhinitis J30.9    Family history of heart disease Z82.49    Obesity (BMI 30-39. 9) E66.9        Current Outpatient Medications on File Prior to Visit   Medication Sig Dispense Refill    metFORMIN (GLUCOPHAGE) 500 mg tablet TAKE 1 TAB BY MOUTH TWO (2) TIMES DAILY (WITH MEALS) FOR 90 DAYS.  180 Tab 0    DULoxetine (CYMBALTA) 60 mg capsule Take 60 mg by mouth daily. No current facility-administered medications on file prior to visit. 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 secondary to hearing loss       Family History   Problem Relation Age of Onset    Bipolar Disorder Brother        Social History     Socioeconomic History    Marital status:      Spouse name: Not on file    Number of children: Not on file    Years of education: Not on file    Highest education level: Not on file   Occupational History    Not on file   Social Needs    Financial resource strain: Not on file    Food insecurity:     Worry: Not on file     Inability: Not on file    Transportation needs:     Medical: Not on file     Non-medical: Not on file   Tobacco Use    Smoking status: Never Smoker    Smokeless tobacco: Never Used   Substance and Sexual Activity    Alcohol use:  Yes     Alcohol/week: 1.0 oz     Types: 2 Standard drinks or equivalent per week    Drug use: No    Sexual activity: Yes     Partners: Female   Lifestyle    Physical activity:     Days per week: Not on file     Minutes per session: Not on file    Stress: Not on file   Relationships    Social connections:     Talks on phone: Not on file     Gets together: Not on file     Attends Manda[employer] service: Not on file     Active member of club or organization: Not on file     Attends meetings of clubs or organizations: Not on file     Relationship status: Not on file    Intimate partner violence:     Fear of current or ex partner: Not on file     Emotionally abused: Not on file     Physically abused: Not on file     Forced sexual activity: Not on file   Other Topics Concern    Not on file   Social History Narrative    Not on file       Review of Systems   Constitutional: Positive for weight loss (intentional). Negative for malaise/fatigue. HENT: Negative for congestion. Eyes: Negative for blurred vision and pain. Respiratory: Negative for cough and shortness of breath. Cardiovascular: Negative for chest pain and palpitations. Gastrointestinal: Negative for abdominal pain, constipation, diarrhea and heartburn. Genitourinary: Negative for frequency and urgency. Musculoskeletal: Negative for joint pain and myalgias. Neurological: Positive for sensory change (area on R thigh). Negative for dizziness, tingling, weakness and headaches. Endo/Heme/Allergies: Positive for environmental allergies. Psychiatric/Behavioral: Positive for depression (improved on cymbalta). Negative for memory loss and substance abuse. The patient is nervous/anxious (improved on cymbalta) and has insomnia (improved on trazodone). Visit Vitals  /75 (BP 1 Location: Left arm, BP Patient Position: Sitting)   Pulse 65   Temp 98.1 °F (36.7 °C) (Oral)   Resp 18   Ht 6' (1.829 m)   Wt 264 lb 6.4 oz (119.9 kg)   SpO2 97%   BMI 35.86 kg/m²       Physical Exam   Constitutional: He is oriented to person, place, and time. He appears well-developed. No distress. Obese   HENT:   Right Ear: External ear normal.   Left Ear: External ear normal.   Eyes: Conjunctivae and EOM are normal. Right eye exhibits no discharge. Left eye exhibits no discharge. Neck: Normal range of motion. Neck supple. Cardiovascular: Normal rate, regular rhythm and normal heart sounds. Pulses:       Dorsalis pedis pulses are 2+ on the right side, and 2+ on the left side. Pulmonary/Chest: Effort normal and breath sounds normal. He has no wheezes. Abdominal: Soft. Bowel sounds are normal. There is no tenderness. Musculoskeletal:   R thigh nontender, negative for swelling, erythema, mass, ROM normal   Lymphadenopathy:     He has no cervical adenopathy.    Neurological: He is alert and oriented to person, place, and time. Skin: He is not diaphoretic. Dry skin on both feet   Psychiatric: He has a normal mood and affect. His behavior is normal.   Nursing note and vitals reviewed. Diabetic foot exam:     Left: Vibratory sensation normal    Sharp/dull discrimination normal    Filament test normal sensation with micro filament   Pulse DP: 2+ (normal)   Deformities: None  Right: Vibratory sensation normal   Sharp/dull discrimination normal   Filament test normal sensation with micro filament   Pulse DP: 2+ (normal)   Deformities: None      ASSESSMENT and PLAN    ICD-10-CM ICD-9-CM    1. New onset type 2 diabetes mellitus (HCC) E11.9 250.00 AMB POC URINE, MICROALBUMIN, SEMIQUANT (3 RESULTS)      METABOLIC PANEL, COMPREHENSIVE      CBC W/O DIFF      HEMOGLOBIN A1C WITH EAG      TSH 3RD GENERATION      HM DIABETES FOOT EXAM    POC microalbumin tested in office: albumin 30, creatinine 300, and microalbumin/creat ratio <30. CMP, CBC, HbA1c, and TSH ordered. Foot exam performed. 2. Numbness of anterior thigh R20.0 782.0 Discussed that his sensation should slowly return in the next 2-3 months. 3. Obesity (BMI 30-39. 9) E66.9 278.00 Commended on weight loss. Encouraged continuing healthy diet and increasing exercise. 4. Dysthymia F34.1 300.4 Followed by psychiatry and doing well on current medication. This plan was reviewed with the patient and patient agrees. All questions were answered. This scribe documentation was reviewed by me and accurately reflects the examination and decisions made by me. This note will not be viewable in [address]

## 2019-06-07 ENCOUNTER — TELEPHONE (OUTPATIENT)
Dept: PRIMARY CARE CLINIC | Age: 54
End: 2019-06-07

## 2019-06-07 LAB
ALBUMIN SERPL-MCNC: 4.3 G/DL (ref 3.5–5.5)
ALBUMIN/GLOB SERPL: 1.5 {RATIO} (ref 1.2–2.2)
ALP SERPL-CCNC: 67 IU/L (ref 39–117)
ALT SERPL-CCNC: 53 IU/L (ref 0–44)
AST SERPL-CCNC: 31 IU/L (ref 0–40)
BILIRUB SERPL-MCNC: 0.7 MG/DL (ref 0–1.2)
BUN SERPL-MCNC: 10 MG/DL (ref 6–24)
BUN/CREAT SERPL: 13 (ref 9–20)
CALCIUM SERPL-MCNC: 9.4 MG/DL (ref 8.7–10.2)
CHLORIDE SERPL-SCNC: 103 MMOL/L (ref 96–106)
CO2 SERPL-SCNC: 23 MMOL/L (ref 20–29)
CREAT SERPL-MCNC: 0.8 MG/DL (ref 0.76–1.27)
ERYTHROCYTE [DISTWIDTH] IN BLOOD BY AUTOMATED COUNT: 14.4 % (ref 12.3–15.4)
EST. AVERAGE GLUCOSE BLD GHB EST-MCNC: 123 MG/DL
GLOBULIN SER CALC-MCNC: 2.9 G/DL (ref 1.5–4.5)
GLUCOSE SERPL-MCNC: 97 MG/DL (ref 65–99)
HBA1C MFR BLD: 5.9 % (ref 4.8–5.6)
HCT VFR BLD AUTO: 44.6 % (ref 37.5–51)
HGB BLD-MCNC: 15 G/DL (ref 13–17.7)
MCH RBC QN AUTO: 33.2 PG (ref 26.6–33)
MCHC RBC AUTO-ENTMCNC: 33.6 G/DL (ref 31.5–35.7)
MCV RBC AUTO: 99 FL (ref 79–97)
PLATELET # BLD AUTO: 245 X10E3/UL (ref 150–450)
POTASSIUM SERPL-SCNC: 4.2 MMOL/L (ref 3.5–5.2)
PROT SERPL-MCNC: 7.2 G/DL (ref 6–8.5)
RBC # BLD AUTO: 4.52 X10E6/UL (ref 4.14–5.8)
SODIUM SERPL-SCNC: 140 MMOL/L (ref 134–144)
TSH SERPL DL<=0.005 MIU/L-ACNC: 1.97 UIU/ML (ref 0.45–4.5)
WBC # BLD AUTO: 4.7 X10E3/UL (ref 3.4–10.8)

## 2019-06-07 NOTE — TELEPHONE ENCOUNTER
Patient wants results. Undermedia, you have note that you will discuss with patient at visit. Patient cancelled, do you want us to call the paitent and reschedule or let them know the results?

## 2019-06-10 NOTE — TELEPHONE ENCOUNTER
I left a message on his home number to call back. His cell voice mail is full. Please let him know sleep study showed Mild sleep apnea. Hopefull with weight loss it will improve and he won`t need a CPAP.

## 2019-06-18 LAB
SPECIMEN STATUS REPORT, ROLRST: NORMAL
VIT B12 SERPL-MCNC: 638 PG/ML (ref 232–1245)

## 2019-06-18 NOTE — PROGRESS NOTES
Mr. Blue Due , your diabetes numbers have improved significantly. You are in pre diabetic range now. Great Job!!! Keep up the good work. Rest of the blood work came back fine as well.

## 2019-09-05 DIAGNOSIS — E11.9 NEW ONSET TYPE 2 DIABETES MELLITUS (HCC): ICD-10-CM

## 2019-09-05 RX ORDER — METFORMIN HYDROCHLORIDE 500 MG/1
500 TABLET ORAL 2 TIMES DAILY WITH MEALS
Qty: 180 TAB | Refills: 0 | Status: SHIPPED | OUTPATIENT
Start: 2019-09-05 | End: 2020-01-05

## 2019-11-04 ENCOUNTER — OFFICE VISIT (OUTPATIENT)
Dept: PRIMARY CARE CLINIC | Age: 54
End: 2019-11-04

## 2019-11-04 VITALS
WEIGHT: 281 LBS | BODY MASS INDEX: 38.06 KG/M2 | SYSTOLIC BLOOD PRESSURE: 93 MMHG | HEIGHT: 72 IN | TEMPERATURE: 98.2 F | HEART RATE: 66 BPM | RESPIRATION RATE: 16 BRPM | DIASTOLIC BLOOD PRESSURE: 65 MMHG | OXYGEN SATURATION: 97 %

## 2019-11-04 DIAGNOSIS — Z23 NEED FOR SHINGLES VACCINE: ICD-10-CM

## 2019-11-04 DIAGNOSIS — E11.9 WELL CONTROLLED DIABETES MELLITUS (HCC): Primary | ICD-10-CM

## 2019-11-04 DIAGNOSIS — E66.9 OBESITY (BMI 30-39.9): ICD-10-CM

## 2019-11-04 DIAGNOSIS — K76.0 FATTY LIVER: ICD-10-CM

## 2019-11-04 NOTE — PROGRESS NOTES
Identified pt with two pt identifiers(name and ). Reviewed record in preparation for visit and have obtained necessary documentation. Chief Complaint   Patient presents with    Diabetes     f/u    Fatty Liver     pt states he was dx with fatty liver and wanted to see if testing could be done to check current status of this        Health Maintenance Due   Topic    Pneumococcal 0-64 years (1 of 1 - PPSV23)    EYE EXAM RETINAL OR DILATED     Shingrix Vaccine Age 50> (1 of 2)   - last eye exam approx 1 yr ago, pt aware he is due    Coordination of Care Questionnaire:  :   1) Have you been to an emergency room, urgent care, or hospitalized since your last visit? If yes, where when, and reason for visit? no       2. Have seen or consulted any other health care provider since your last visit? If yes, where when, and reason for visit? NO      Patient is accompanied by self I have received verbal consent from [address] [place] [address] to discuss any/all medical information while they are present in the room.

## 2019-11-04 NOTE — PROGRESS NOTES
Written by Alistair Wright, as dictated by Dr. Abigail Sanchez MD.    Gretchen Gregory is a 53 y.o. male. HPI  The patient presents today for follow up on DM-2. Patient is fasting for labs today. Compliant on Metformin 500 mg BID. He admits he has not been watching his sugar and carbohydrate intake recently. He also notes that bread is an issue for him, as it is readily available at home. He plans to control what he eats again. He has not had his eye examination yet, but knows that he needs to get his eyes checked soon. Denies congestion, cough, and headaches. He was diagnosed with fatty liver disease, and wanted to see if testing was available could be done to check it. He has gained weight since his last visit. He weighed 281 lbs today and weighed 264 lbs on 06/06/19. He states that there has been stress at home, and has stopped exercising, and controlling his eating in the past 6 months. He previously tried Wellbutrin, which did not work for him. He has not tried phentermine, but notes that it is more of an issue of stress-eating rather than portions. He would also prefer to not to take any more medication. He has not been going to counseling, but has been managing it as best as they can. He admits snoring at night, and was sent for a sleep study previously through [facility], but had not received the results, or received a call back from them since completing the study at home. Sleep study results showed \"mild obstructive sleep apnea. \" He notes having some improvement in morning fatigue with previous weight loss. BP is low today at 93/65. He notes having some lightheadedness this morning, but otherwise, denies dizziness or lightheadedness. He takes Cymbalta 60 mg BID, and feels like his mood has been the same, but believes that this dosage is working well for him. He received the flu shot this year.  He is interested in getting the Shingrix vaccine, but had heard that is was back-ordered. Patient Active Problem List   Diagnosis Code    Allergic rhinitis J30.9    Family history of heart disease Z82.49    Obesity (BMI 30-39. 9) E66.9        Current Outpatient Medications on File Prior to Visit   Medication Sig Dispense Refill    metFORMIN (GLUCOPHAGE) 500 mg tablet TAKE 1 TAB BY MOUTH TWO (2) TIMES DAILY (WITH MEALS) FOR 90 DAYS. 180 Tab 0    DULoxetine (CYMBALTA) 60 mg capsule Take 60 mg by mouth daily. No current facility-administered medications on file prior to visit. 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 secondary to hearing loss       Family History   Problem Relation Age of Onset    Bipolar Disorder Brother        Social History     Socioeconomic History    Marital status:      Spouse name: Not on file    Number of children: Not on file    Years of education: Not on file    Highest education level: Not on file   Occupational History    Not on file   Social Needs    Financial resource strain: Not on file    Food insecurity:     Worry: Not on file     Inability: Not on file    Transportation needs:     Medical: Not on file     Non-medical: Not on file   Tobacco Use    Smoking status: Never Smoker    Smokeless tobacco: Never Used   Substance and Sexual Activity    Alcohol use:  Yes     Alcohol/week: 1.7 standard drinks     Types: 2 Standard drinks or equivalent per week    Drug use: No    Sexual activity: Yes     Partners: Female   Lifestyle    Physical activity:     Days per week: Not on file     Minutes per session: Not on file    Stress: Not on file   Relationships    Social connections:     Talks on phone: Not on file     Gets together: Not on file     Attends Taoist service: Not on file     Active member of club or organization: Not on file     Attends meetings of clubs or organizations: Not on file     Relationship status: Not on file    Intimate partner violence:     Fear of current or ex partner: Not on file     Emotionally abused: Not on file     Physically abused: Not on file     Forced sexual activity: Not on file   Other Topics Concern    Not on file   Social History Narrative    Not on file       Review of Systems   Constitutional: Negative for malaise/fatigue and weight loss. HENT: Negative for congestion and hearing loss. Respiratory: Negative for cough and shortness of breath. Cardiovascular: Negative for chest pain, palpitations and leg swelling. Musculoskeletal: Negative for joint pain and myalgias. Neurological: Negative for dizziness and headaches. Psychiatric/Behavioral: Negative for depression and substance abuse. The patient is not nervous/anxious and does not have insomnia. Visit Vitals  BP 93/65   Pulse 66   Temp 98.2 °F (36.8 °C) (Oral)   Resp 16   Ht 6' (1.829 m)   Wt 281 lb (127.5 kg)   SpO2 97%   BMI 38.11 kg/m²       Physical Exam   Constitutional: He is oriented to person, place, and time. He appears well-developed. No distress. obese   HENT:   Head: Normocephalic and atraumatic. Right Ear: External ear normal.   Left Ear: External ear normal.   Eyes: Pupils are equal, round, and reactive to light. Conjunctivae and EOM are normal. Right eye exhibits no discharge. Left eye exhibits no discharge. Neck: Normal range of motion. Neck supple. Cardiovascular: Normal rate, regular rhythm and normal heart sounds. Exam reveals no gallop and no friction rub. No murmur heard. Pulmonary/Chest: Effort normal and breath sounds normal. He has no wheezes. Abdominal: Soft. Bowel sounds are normal. There is no tenderness. Musculoskeletal: Normal range of motion. Neurological: He is alert and oriented to person, place, and time.  He has normal reflexes. Skin: He is not diaphoretic. Psychiatric: He has a normal mood and affect. His behavior is normal. Thought content normal.   Nursing note and vitals reviewed. ASSESSMENT and PLAN    ICD-10-CM ICD-9-CM    1. Well controlled diabetes mellitus ([facility] [address] L96.9 194.86 METABOLIC PANEL, COMPREHENSIVE      LIPID PANEL      HEMOGLOBIN A1C WITH EAG    CMP, Lipid Panel and Hemoglobin A1c.   2. Fatty liver K76.0 571.8 Discussed weight loss and Metformin are the best treatment for fatty liver disease. Advised losing 15 lbs by 02/2020, and then will repeat the US ABD to determine the status of fatty liver disease. 3. Obesity (BMI 30-39. 9) E66.9 278.00 Encouraged diet and exercise. Discussed weight loss helps with snoring and obstructive sleep apnea. If no improvement in the obstructive sleep apnea, will consider repeating sleep study in 02/2020.   4. Need for shingles vaccine Z  23 V04.89 varicella-zoster recombinant, PF, (SHINGRIX, PF,) 50 mcg/0.5 mL susr injection sent to pharmacy. Shingrix prescribed. Potential side effects were discussed. Advised pt that this is a 2-shot series which needs to be taken within 6 months of the first shot. This plan was reviewed with the patient and patient agrees. All questions were answered. This scribe documentation was reviewed by me and accurately reflects the examination and decisions made by me. This note will not be viewable in [address]

## 2019-11-05 LAB
ALBUMIN SERPL-MCNC: 4.3 G/DL (ref 3.5–5.5)
ALBUMIN/GLOB SERPL: 1.4 {RATIO} (ref 1.2–2.2)
ALP SERPL-CCNC: 83 IU/L (ref 39–117)
ALT SERPL-CCNC: 31 IU/L (ref 0–44)
AST SERPL-CCNC: 25 IU/L (ref 0–40)
BILIRUB SERPL-MCNC: 0.3 MG/DL (ref 0–1.2)
BUN SERPL-MCNC: 15 MG/DL (ref 6–24)
BUN/CREAT SERPL: 17 (ref 9–20)
CALCIUM SERPL-MCNC: 9.6 MG/DL (ref 8.7–10.2)
CHLORIDE SERPL-SCNC: 102 MMOL/L (ref 96–106)
CHOLEST SERPL-MCNC: 138 MG/DL (ref 100–199)
CO2 SERPL-SCNC: 26 MMOL/L (ref 20–29)
CREAT SERPL-MCNC: 0.89 MG/DL (ref 0.76–1.27)
EST. AVERAGE GLUCOSE BLD GHB EST-MCNC: 120 MG/DL
GLOBULIN SER CALC-MCNC: 3.1 G/DL (ref 1.5–4.5)
GLUCOSE SERPL-MCNC: 111 MG/DL (ref 65–99)
HBA1C MFR BLD: 5.8 % (ref 4.8–5.6)
HDLC SERPL-MCNC: 44 MG/DL
LDLC SERPL CALC-MCNC: 84 MG/DL (ref 0–99)
POTASSIUM SERPL-SCNC: 5.4 MMOL/L (ref 3.5–5.2)
PROT SERPL-MCNC: 7.4 G/DL (ref 6–8.5)
SODIUM SERPL-SCNC: 141 MMOL/L (ref 134–144)
TRIGL SERPL-MCNC: 50 MG/DL (ref 0–149)
VLDLC SERPL CALC-MCNC: 10 MG/DL (ref 5–40)

## 2019-11-06 NOTE — PROGRESS NOTES
Mr. Ella Yates News! your diabetes number are improving close to normal now. Cholesterol numbers look great. Keep up the good work!

## 2019-11-21 ENCOUNTER — TELEPHONE (OUTPATIENT)
Dept: PRIMARY CARE CLINIC | Age: 54
End: 2019-11-21

## 2019-11-21 DIAGNOSIS — J01.00 SUBACUTE MAXILLARY SINUSITIS: Primary | ICD-10-CM

## 2019-11-21 RX ORDER — FLUTICASONE PROPIONATE 50 MCG
SPRAY, SUSPENSION (ML) NASAL
Qty: 1 BOTTLE | Refills: 0 | Status: SHIPPED | OUTPATIENT
Start: 2019-11-21

## 2019-11-21 NOTE — TELEPHONE ENCOUNTER
----- Message from uLba Rodriguez sent at 11/21/2019  7:48 AM EST -----  Regarding: Dr. Shanel Dao first and last name: ((patient))    Reason for call: sinus problems    Callback required yes/no and why: Yes to know what to do     Best contact number(s): [phone]    Details to clarify the request:  Pt is out of town and would like the nurse to contact him about sinus infection he is coughing up for a few weeks now. Pt would like to know what is recommended?        Luba Rodriguez

## 2019-11-22 NOTE — TELEPHONE ENCOUNTER
----- Message from Jesi Edouard sent at 11/21/2019  4:34 PM EST -----  Regarding: / Telephone  Level 1/Escalated Issue      Caller's first and last name and relationship (if not the patient):      Best contact number(s):  [phone]    What are the symptoms:  Coughing up Yellow/Brown Mucus,fever     Transfer successful - yes/no (include outcome):  No, no answer    Transfer declined - yes/no (include reason):  No, no answer    Was caller advised to seek appropriate level of care - yes/no:  Yes    Details to clarify the request:  Pt states he has had a cough for about 2 weeks and it is not getting better, Pt believes he has an infection.        Jesi Edouard

## 2019-11-22 NOTE — TELEPHONE ENCOUNTER
Called and LVM for patient re: RX sent to pharmacy. Encouraged to call with questions. End of encounter.

## 2020-01-05 DIAGNOSIS — E11.9 NEW ONSET TYPE 2 DIABETES MELLITUS (HCC): ICD-10-CM

## 2020-01-05 RX ORDER — METFORMIN HYDROCHLORIDE 500 MG/1
500 TABLET ORAL 2 TIMES DAILY WITH MEALS
Qty: 180 TAB | Refills: 0 | Status: SHIPPED | OUTPATIENT
Start: 2020-01-05 | End: 2020-05-26

## 2020-02-26 ENCOUNTER — OFFICE VISIT (OUTPATIENT)
Dept: PRIMARY CARE CLINIC | Age: 55
End: 2020-02-26

## 2020-02-26 VITALS
OXYGEN SATURATION: 97 % | DIASTOLIC BLOOD PRESSURE: 80 MMHG | HEIGHT: 72 IN | SYSTOLIC BLOOD PRESSURE: 125 MMHG | WEIGHT: 294.2 LBS | RESPIRATION RATE: 18 BRPM | BODY MASS INDEX: 39.85 KG/M2 | HEART RATE: 67 BPM | TEMPERATURE: 97.6 F

## 2020-02-26 DIAGNOSIS — R20.0 NUMBNESS OF RIGHT ANTERIOR THIGH: ICD-10-CM

## 2020-02-26 DIAGNOSIS — E66.01 SEVERE OBESITY (HCC): ICD-10-CM

## 2020-02-26 DIAGNOSIS — E11.9 WELL CONTROLLED DIABETES MELLITUS (HCC): Primary | ICD-10-CM

## 2020-02-26 DIAGNOSIS — G47.33 OBSTRUCTIVE SLEEP APNEA HYPOPNEA, MILD: ICD-10-CM

## 2020-02-26 PROBLEM — E66.9 OBESITY (BMI 30-39.9): Status: RESOLVED | Noted: 2019-06-06 | Resolved: 2020-02-26

## 2020-02-26 RX ORDER — METFORMIN HYDROCHLORIDE 500 MG/1
500 TABLET ORAL 2 TIMES DAILY WITH MEALS
Qty: 180 TAB | Refills: 0 | Status: CANCELLED | OUTPATIENT
Start: 2020-02-26 | End: 2020-05-26

## 2020-02-26 NOTE — PROGRESS NOTES
Written by Raymundo Ward, as dictated by Dr. Brian Amor MD.    Donna Dorman is a 53 y.o. male. HPI  The patient presents today for follow-up on DM-2. Patient is fasting this morning. Compliant on Metformin 500 mg BID. He does still eat bread, pasta and rice, though not daily. He admits he does not eat fruit daily, but does eat vegetables daily. He is trying to eat healthier overall, but admits he does snack frequently, especially as there are chips and candy at home. He has not tried Weight Watchers, but is interested in trying intermittent fasting. He has gained weight. He weighs 294 lbs today and weighed 281 lbs on 11/04/19. He admits he has been stress eating over the holidays, especially due to home stress involving his teenage son. He is not exercising regularly, and notes he needs to start a more regular exercise routine. Denies chest tightness and SOB. He reports numbness on the lateral side of his R knee. It is not painful, but it is numb and tingly in that area. It does not limit his physical activity, other than it being bothersome. He still has some swelling on his L eyebrow, but it is slowly improving as compared to before. He feels Cymbalta 60 mg has been working well for him, and his mood overall has been better. He does have a rash on his face today, which he believes is due to shaving this morning. He previously had a Sleep Study and was told he has Mild Sleep Apnea. Patient Active Problem List   Diagnosis Code    Allergic rhinitis J30.9    Family history of heart disease Z82.49    Severe obesity ([facility] [address] E66.01        Current Outpatient Medications on File Prior to Visit   Medication Sig Dispense Refill    metFORMIN (GLUCOPHAGE) 500 mg tablet TAKE 1 TAB BY MOUTH TWO (2) TIMES DAILY (WITH MEALS) FOR 90 DAYS. 180 Tab 0    fluticasone propionate (FLONASE) 50 mcg/actuation nasal spray 1 spray each nostril daily.  1 Bottle 0    DULoxetine (CYMBALTA) 60 mg capsule Take 60 mg by mouth daily. No current facility-administered medications on file prior to visit. 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 secondary to hearing loss       Family History   Problem Relation Age of Onset    Bipolar Disorder Brother        Social History     Socioeconomic History    Marital status:      Spouse name: Not on file    Number of children: Not on file    Years of education: Not on file    Highest education level: Not on file   Occupational History    Not on file   Social Needs    Financial resource strain: Not on file    Food insecurity:     Worry: Not on file     Inability: Not on file    Transportation needs:     Medical: Not on file     Non-medical: Not on file   Tobacco Use    Smoking status: Never Smoker    Smokeless tobacco: Never Used   Substance and Sexual Activity    Alcohol use:  Yes     Alcohol/week: 1.7 standard drinks     Types: 2 Standard drinks or equivalent per week    Drug use: No    Sexual activity: Yes     Partners: Female   Lifestyle    Physical activity:     Days per week: Not on file     Minutes per session: Not on file    Stress: Not on file   Relationships    Social connections:     Talks on phone: Not on file     Gets together: Not on file     Attends Judaism service: Not on file     Active member of club or organization: Not on file     Attends meetings of clubs or organizations: Not on file     Relationship status: Not on file    Intimate partner violence:     Fear of current or ex partner: Not on file     Emotionally abused: Not on file     Physically abused: Not on file     Forced sexual activity: Not on file Other Topics Concern    Not on file   Social History Narrative    Not on file       No visits with results within 3 Month(s) from this visit. Latest known visit with results is:   Office Visit on 11/04/2019   Component Date Value Ref Range Status    Glucose 11/04/2019 111* 65 - 99 mg/dL Final    BUN 11/04/2019 15  6 - 24 mg/dL Final    Creatinine 11/04/2019 0.89  0.76 - 1.27 mg/dL Final    GFR est non-AA 11/04/2019 96  >59 mL/min/1.73 Final    GFR est AA 11/04/2019 111  >59 mL/min/1.73 Final    BUN/Creatinine ratio 11/04/2019 17  9 - 20 Final    Sodium 11/04/2019 141  134 - 144 mmol/L Final    Potassium 11/04/2019 5.4* 3.5 - 5.2 mmol/L Final    Chloride 11/04/2019 102  96 - 106 mmol/L Final    CO2 11/04/2019 26  20 - 29 mmol/L Final    Calcium 11/04/2019 9.6  8.7 - 10.2 mg/dL Final    Protein, total 11/04/2019 7.4  6.0 - 8.5 g/dL Final    Albumin 11/04/2019 4.3  3.5 - 5.5 g/dL Final    GLOBULIN, TOTAL 11/04/2019 3.1  1.5 - 4.5 g/dL Final    A-G Ratio 11/04/2019 1.4  1.2 - 2.2 Final    Bilirubin, total 11/04/2019 0.3  0.0 - 1.2 mg/dL Final    Alk. phosphatase 11/04/2019 83  39 - 117 IU/L Final    AST (SGOT) 11/04/2019 25  0 - 40 IU/L Final    ALT (SGPT) 11/04/2019 31  0 - 44 IU/L Final    Cholesterol, total 11/04/2019 138  100 - 199 mg/dL Final    Triglyceride 11/04/2019 50  0 - 149 mg/dL Final    HDL Cholesterol 11/04/2019 44  >39 mg/dL Final    VLDL, calculated 11/04/2019 10  5 - 40 mg/dL Final    LDL, calculated 11/04/2019 84  0 - 99 mg/dL Final    Hemoglobin A1c 11/04/2019 5.8* 4.8 - 5.6 % Final    Comment:          Prediabetes: 5.7 - 6.4           Diabetes: >6.4           Glycemic control for adults with diabetes: <7.0      Estimated average glucose 11/04/2019 120  mg/dL Final       Review of Systems   Constitutional: Negative for malaise/fatigue and weight loss. Eyes: Negative for photophobia. Respiratory: Negative for cough and shortness of breath.     Musculoskeletal: Negative for joint pain and myalgias. Neurological: Positive for tingling (with numbness on Lateral side of R knee). Negative for dizziness and headaches. Psychiatric/Behavioral: Negative for depression and substance abuse. The patient is not nervous/anxious and does not have insomnia. Visit Vitals  /80 (BP 1 Location: Left arm, BP Patient Position: Sitting)   Pulse 67   Temp 97.6 °F (36.4 °C) (Oral)   Resp 18   Ht 6' (1.829 m)   Wt 294 lb 3.2 oz (133.4 kg)   SpO2 97%   BMI 39.90 kg/m²       Physical Exam  Vitals signs and nursing note reviewed. Constitutional:       General: He is not in acute distress. Appearance: Normal appearance. He is well-developed and well-groomed. He is obese. He is not diaphoretic. HENT:      Head: Normocephalic and atraumatic. Right Ear: External ear normal.      Left Ear: External ear normal.   Eyes:      General:         Right eye: No discharge. Left eye: No discharge. Conjunctiva/sclera: Conjunctivae normal.      Pupils: Pupils are equal, round, and reactive to light. Neck:      Musculoskeletal: Normal range of motion and neck supple. Cardiovascular:      Rate and Rhythm: Normal rate and regular rhythm. Heart sounds: Normal heart sounds. No murmur. No friction rub. No gallop. Pulmonary:      Effort: Pulmonary effort is normal.      Breath sounds: Normal breath sounds. No wheezing. Abdominal:      General: Bowel sounds are normal.      Palpations: Abdomen is soft. Tenderness: There is no abdominal tenderness. Musculoskeletal: Normal range of motion. Neurological:      Mental Status: He is alert and oriented to person, place, and time. Deep Tendon Reflexes: Reflexes are normal and symmetric. Psychiatric:         Behavior: Behavior normal.         Thought Content: Thought content normal.         ASSESSMENT and PLAN    ICD-10-CM ICD-9-CM    1.  Well [facility] ([facility] F54.1 196.46 METABOLIC PANEL, COMPREHENSIVE      CBC W/O DIFF      LIPID PANEL      HEMOGLOBIN A1C WITH EAG    CMP, CBC, Lipid Panel and Hemoglobin A1c ordered. 2. Severe obesity ([facility] [address] E66.01 278.01 I recommend the patient download the Weight Watchers zoila to help track food consumption. The patient should not use their weekly points, as weekly points are for weight maintenance and the patient is trying to lose weight. The patient should eat plenty of fruits and vegetables. Encouraged the patient to exercise. Discussed that a healthy lifestyle requires 5,000-7,000 steps daily, but weight loss requires 10,000 steps daily. 3. Numbness of right anterior thigh R20.8 782.0 Advised patient to continue monitoring the area on his R knee. Pt should let me know if the tingling/numbness spreads or starts to affect his ability to exercise, and will order an EMG. 4. Obstructive sleep apnea hypopnea, mild G47.33 327.23 Discussed weight loss can help with his sleep apnea. This plan was reviewed with the patient and patient agrees. All questions were answered. This scribe documentation was reviewed by me and accurately reflects the examination and decisions made by me. This note will not be viewable in [address]

## 2020-02-26 NOTE — PROGRESS NOTES
Chief Complaint   Patient presents with    Diabetes     1. Have you been to the ER, urgent care clinic since your last visit? Hospitalized since your last visit? No    2. Have you seen or consulted any other health care providers outside of the [address] [place] [address] since your last visit? Include any pap smears or colon screening.  No

## 2020-02-27 LAB
ALBUMIN SERPL-MCNC: 4.5 G/DL (ref 3.8–4.9)
ALBUMIN/GLOB SERPL: 1.5 {RATIO} (ref 1.2–2.2)
ALP SERPL-CCNC: 93 IU/L (ref 39–117)
ALT SERPL-CCNC: 80 IU/L (ref 0–44)
AST SERPL-CCNC: 41 IU/L (ref 0–40)
BILIRUB SERPL-MCNC: 0.3 MG/DL (ref 0–1.2)
BUN SERPL-MCNC: 14 MG/DL (ref 6–24)
BUN/CREAT SERPL: 16 (ref 9–20)
CALCIUM SERPL-MCNC: 9.2 MG/DL (ref 8.7–10.2)
CHLORIDE SERPL-SCNC: 102 MMOL/L (ref 96–106)
CHOLEST SERPL-MCNC: 134 MG/DL (ref 100–199)
CO2 SERPL-SCNC: 24 MMOL/L (ref 20–29)
CREAT SERPL-MCNC: 0.87 MG/DL (ref 0.76–1.27)
ERYTHROCYTE [DISTWIDTH] IN BLOOD BY AUTOMATED COUNT: 12.9 % (ref 11.6–15.4)
EST. AVERAGE GLUCOSE BLD GHB EST-MCNC: 148 MG/DL
GLOBULIN SER CALC-MCNC: 3 G/DL (ref 1.5–4.5)
GLUCOSE SERPL-MCNC: 148 MG/DL (ref 65–99)
HBA1C MFR BLD: 6.8 % (ref 4.8–5.6)
HCT VFR BLD AUTO: 42.4 % (ref 37.5–51)
HDLC SERPL-MCNC: 45 MG/DL
HGB BLD-MCNC: 14.7 G/DL (ref 13–17.7)
LDLC SERPL CALC-MCNC: 72 MG/DL (ref 0–99)
MCH RBC QN AUTO: 33.1 PG (ref 26.6–33)
MCHC RBC AUTO-ENTMCNC: 34.7 G/DL (ref 31.5–35.7)
MCV RBC AUTO: 96 FL (ref 79–97)
PLATELET # BLD AUTO: 181 X10E3/UL (ref 150–450)
POTASSIUM SERPL-SCNC: 4.6 MMOL/L (ref 3.5–5.2)
PROT SERPL-MCNC: 7.5 G/DL (ref 6–8.5)
RBC # BLD AUTO: 4.44 X10E6/UL (ref 4.14–5.8)
SODIUM SERPL-SCNC: 140 MMOL/L (ref 134–144)
TRIGL SERPL-MCNC: 83 MG/DL (ref 0–149)
VLDLC SERPL CALC-MCNC: 17 MG/DL (ref 5–40)
WBC # BLD AUTO: 4.6 X10E3/UL (ref 3.4–10.8)

## 2020-03-02 NOTE — PROGRESS NOTES
Jarred, hope you had a good weekend. Your blood report showed Diabetes numbers have gone up from 5.8 to 6.8. I would suggest going back on low calories diet & trying to loose weight. Liver enzymes are up as well. Have you been drinking or consuming more Alcoholic  beverages ? You need to follow low carb diet & exercise for 3 months. Repeat labs in 3 month.

## 2020-05-23 DIAGNOSIS — E11.9 NEW ONSET TYPE 2 DIABETES MELLITUS (HCC): ICD-10-CM

## 2020-05-26 ENCOUNTER — OFFICE VISIT (OUTPATIENT)
Dept: PRIMARY CARE CLINIC | Age: 55
End: 2020-05-26

## 2020-05-26 VITALS
WEIGHT: 294 LBS | RESPIRATION RATE: 16 BRPM | BODY MASS INDEX: 39.82 KG/M2 | TEMPERATURE: 97.1 F | SYSTOLIC BLOOD PRESSURE: 118 MMHG | DIASTOLIC BLOOD PRESSURE: 79 MMHG | HEIGHT: 72 IN | HEART RATE: 53 BPM | OXYGEN SATURATION: 97 %

## 2020-05-26 DIAGNOSIS — G47.33 OSA (OBSTRUCTIVE SLEEP APNEA): ICD-10-CM

## 2020-05-26 DIAGNOSIS — K76.0 HEPATIC STEATOSIS: ICD-10-CM

## 2020-05-26 DIAGNOSIS — E11.9 CONTROLLED TYPE 2 DIABETES MELLITUS WITHOUT COMPLICATION, WITHOUT LONG-TERM CURRENT USE OF INSULIN (HCC): Primary | ICD-10-CM

## 2020-05-26 DIAGNOSIS — E66.9 OBESITY (BMI 30-39.9): ICD-10-CM

## 2020-05-26 PROBLEM — E66.01 SEVERE OBESITY (HCC): Status: RESOLVED | Noted: 2020-02-26 | Resolved: 2020-05-26

## 2020-05-26 LAB
ALBUMIN UR QL STRIP: 10 MG/L
CREATININE, URINE POC: 50 MG/DL
HBA1C MFR BLD HPLC: 6.9 %
MICROALBUMIN/CREAT RATIO POC: <30 MG/G

## 2020-05-26 RX ORDER — METFORMIN HYDROCHLORIDE 850 MG/1
850 TABLET ORAL 2 TIMES DAILY WITH MEALS
Qty: 60 TAB | Refills: 2 | Status: SHIPPED | OUTPATIENT
Start: 2020-05-26 | End: 2020-09-06

## 2020-05-26 RX ORDER — METFORMIN HYDROCHLORIDE 500 MG/1
500 TABLET ORAL 2 TIMES DAILY WITH MEALS
Qty: 180 TAB | Refills: 0 | Status: SHIPPED | OUTPATIENT
Start: 2020-05-26 | End: 2020-08-24

## 2020-05-26 NOTE — PROGRESS NOTES
Written by Nikolas Lew, as dictated by Dr. Ruddy Solis MD.    Damian Howard is a 53 y.o. male. HPI      The patient presents today for a diabetes follow-up. He is fasting for labs. He had his Hemoglobin A1C checked today and it was elevated at 6.9%. He has been trying to walk more but has not made much changes with his diet. He is compliant on Metformin 500 mg BID. He is open to going up on his Metformin dose. He will occasionally feel the need to use the bathroom after taking Metformin. He plans to try to lose weight now that the weather is getting warmer. Denies diarrhea. He does not use a CPAP at night and he has been tested for sleep apnea, but had a mild apnea. He was told with weight loss it should improve. He did have wine with dinner last night, his AST was elevated at 41 and his ALT was elevated at 80 in February 2020. He has h/o fatty liver. His feet are very dry, he does try to use Vaseline occasionally. Patient Active Problem List   Diagnosis Code    Allergic rhinitis J30.9    Family history of heart disease Z82.49    Severe obesity ([facility] [address] E66.01    Obstructive sleep apnea hypopnea, mild G47.33        Current Outpatient Medications on File Prior to Visit   Medication Sig Dispense Refill    fluticasone propionate (FLONASE) 50 mcg/actuation nasal spray 1 spray each nostril daily. 1 Bottle 0    DULoxetine (CYMBALTA) 60 mg capsule Take 60 mg by mouth daily. No current facility-administered medications on file prior to visit. 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  secondary to hearing loss       Family History   Problem Relation Age of Onset  Bipolar Disorder Brother        Social History     Socioeconomic History    Marital status:      Spouse name: Not on file    Number of children: Not on file    Years of education: Not on file    Highest education level: Not on file   Occupational History    Not on file   Social Needs    Financial resource strain: Not on file    Food insecurity     Worry: Not on file     Inability: Not on file    Transportation needs     Medical: Not on file     Non-medical: Not on file   Tobacco Use    Smoking status: Never Smoker    Smokeless tobacco: Never Used   Substance and Sexual Activity    Alcohol use: Yes     Alcohol/week: 1.7 standard drinks     Types: 2 Standard drinks or equivalent per week    Drug use: No    Sexual activity: Yes     Partners: Female   Lifestyle    Physical activity     Days per week: Not on file     Minutes per session: Not on file    Stress: Not on file   Relationships    Social connections     Talks on phone: Not on file     Gets together: Not on file     Attends Muslim service: Not on file     Active member of club or organization: Not on file     Attends meetings of clubs or organizations: Not on file     Relationship status: Not on file    Intimate partner violence     Fear of current or ex partner: Not on file     Emotionally abused: Not on file     Physically abused: Not on file     Forced sexual activity: Not on file   Other Topics Concern    Not on file   Social History Narrative    Not on file       Office Visit on 05/26/2020   Component Date Value Ref Range Status    Hemoglobin A1c (POC) 05/26/2020 6.9  % Final     Review of Systems   Constitutional: Negative for malaise/fatigue. HENT: Negative for congestion. Eyes: Negative for blurred vision. Respiratory: Negative for shortness of breath. Cardiovascular: Negative for chest pain and leg swelling. Gastrointestinal: Negative for constipation, diarrhea and heartburn.    Genitourinary: Negative for dysuria, frequency and urgency. Musculoskeletal: Negative for joint pain and myalgias. Neurological: Negative for dizziness and headaches. Psychiatric/Behavioral: Negative for depression and substance abuse. The patient is not nervous/anxious and does not have insomnia. Visit Vitals  /79 (BP 1 Location: Left arm, BP Patient Position: Sitting)   Pulse (!) 53   Temp 97.1 °F (36.2 °C) (Oral)   Resp 16   Ht 6' (1.829 m)   Wt 294 lb (133.4 kg)   SpO2 97%   BMI 39.87 kg/m²     Physical Exam  Vitals signs and nursing note reviewed. Constitutional:       General: He is not in acute distress. Appearance: He is not diaphoretic. HENT:      Head: Normocephalic and atraumatic. Right Ear: External ear normal.      Left Ear: External ear normal.   Eyes:      General:         Right eye: No discharge. Left eye: No discharge. Conjunctiva/sclera: Conjunctivae normal.      Pupils: Pupils are equal, round, and reactive to light. Neck:      Musculoskeletal: Normal range of motion and neck supple. Cardiovascular:      Rate and Rhythm: Normal rate and regular rhythm. Heart sounds: Normal heart sounds. No murmur. No friction rub. No gallop. Pulmonary:      Effort: Pulmonary effort is normal.      Breath sounds: Normal breath sounds. No wheezing. Abdominal:      General: Bowel sounds are normal.      Palpations: Abdomen is soft. Tenderness: There is no abdominal tenderness. Musculoskeletal: Normal range of motion. Neurological:      Mental Status: He is alert and oriented to person, place, and time. Psychiatric:         Behavior: Behavior normal.         Thought Content: Thought content normal.         ASSESSMENT and PLAN    ICD-10-CM ICD-9-CM    1. Controlled type 2 diabetes mellitus without complication, without long-term current use of insulin ([facility]) E11.9 250.00 AMB POC HEMOGLOBIN A1C    AMB POC Hemoglobin  Ordered    Pt had Hemoglobin A1C checked today and it was 6.9% . Met formin dose increased      AMB POC URINE, MICROALBUMIN, SEMIQUANT (3 RESULTS)    AMB POC ordered       DIABETES FOOT EXAM    Diabetic foot exam:     Left: Vibratory sensation normal    Sharp/dull discrimination normal    Filament test normal sensation with micro filament   Pulse DP: present    Deformities: None  Right: Vibratory sensation normal   Sharp/dull discrimination normal   Filament test normal sensation with micro filament   Pulse DP: positive    Deformities: None      Advised pt to use Vaseline on his feet for two weeks before he goes to bed and see if that helps with his dryness       metFORMIN (GLUCOPHAGE) 850 mg tablet sent to [facility]     Metformin 850 mg refilled    2. Obesity (BMI 30-39. 9) E66.9 278.00 Encouraged pt to exercise and to eat a healthy diet    3. MIRIAM (obstructive sleep apnea) G47.33 327.23 Pt does not wear a CPAP at night    4. Hepatic steatosis K76.0 571.8 Explained to pt that he does have a fatty liver and will need to loose weight. This plan was reviewed with the patient and patient agrees. All questions were answered. This scribe documentation was reviewed by me and accurately reflects the examination and decisions made by me. This note will not be viewable in [address]

## 2020-05-26 NOTE — PROGRESS NOTES
Chief Complaint   Patient presents with    Follow-up     diabetes follow up.  states that he feels fine

## 2020-09-06 DIAGNOSIS — E11.9 CONTROLLED TYPE 2 DIABETES MELLITUS WITHOUT COMPLICATION, WITHOUT LONG-TERM CURRENT USE OF INSULIN (HCC): ICD-10-CM

## 2020-09-06 RX ORDER — METFORMIN HYDROCHLORIDE 850 MG/1
TABLET ORAL
Qty: 180 TAB | Refills: 0 | Status: SHIPPED | OUTPATIENT
Start: 2020-09-06 | End: 2020-12-17

## 2020-12-17 DIAGNOSIS — E11.9 CONTROLLED TYPE 2 DIABETES MELLITUS WITHOUT COMPLICATION, WITHOUT LONG-TERM CURRENT USE OF INSULIN (HCC): ICD-10-CM

## 2020-12-17 RX ORDER — METFORMIN HYDROCHLORIDE 850 MG/1
TABLET ORAL
Qty: 180 TAB | Refills: 0 | Status: SHIPPED | OUTPATIENT
Start: 2020-12-17 | End: 2021-05-08

## 2021-07-02 ENCOUNTER — OFFICE VISIT (OUTPATIENT)
Dept: PRIMARY CARE CLINIC | Age: 56
End: 2021-07-02
Payer: COMMERCIAL

## 2021-07-02 VITALS
TEMPERATURE: 98.4 F | RESPIRATION RATE: 18 BRPM | SYSTOLIC BLOOD PRESSURE: 133 MMHG | DIASTOLIC BLOOD PRESSURE: 77 MMHG | BODY MASS INDEX: 39.55 KG/M2 | OXYGEN SATURATION: 97 % | WEIGHT: 292 LBS | HEART RATE: 60 BPM | HEIGHT: 72 IN

## 2021-07-02 DIAGNOSIS — B35.1 ONYCHOMYCOSIS: ICD-10-CM

## 2021-07-02 DIAGNOSIS — E66.01 SEVERE OBESITY (BMI 35.0-35.9 WITH COMORBIDITY) (HCC): ICD-10-CM

## 2021-07-02 DIAGNOSIS — F34.1 DYSTHYMIA: ICD-10-CM

## 2021-07-02 DIAGNOSIS — R06.09 DYSPNEA ON EXERTION: ICD-10-CM

## 2021-07-02 DIAGNOSIS — E11.9 CONTROLLED TYPE 2 DIABETES MELLITUS WITHOUT COMPLICATION, WITHOUT LONG-TERM CURRENT USE OF INSULIN (HCC): Primary | ICD-10-CM

## 2021-07-02 DIAGNOSIS — G47.33 OSA (OBSTRUCTIVE SLEEP APNEA): ICD-10-CM

## 2021-07-02 PROCEDURE — 99214 OFFICE O/P EST MOD 30 MIN: CPT | Performed by: INTERNAL MEDICINE

## 2021-07-02 RX ORDER — SEMAGLUTIDE 1.34 MG/ML
0.25 INJECTION, SOLUTION SUBCUTANEOUS
Qty: 1 BOX | Refills: 0 | Status: SHIPPED | OUTPATIENT
Start: 2021-07-02 | End: 2021-08-01

## 2021-07-02 RX ORDER — CICLOPIROX OLAMINE 7.7 MG/G
CREAM TOPICAL 2 TIMES DAILY
Qty: 15 G | Refills: 0 | Status: SHIPPED | OUTPATIENT
Start: 2021-07-02 | End: 2022-03-07 | Stop reason: ALTCHOICE

## 2021-10-25 ENCOUNTER — NURSE TRIAGE (OUTPATIENT)
Dept: OTHER | Facility: CLINIC | Age: 56
End: 2021-10-25

## 2021-10-26 ENCOUNTER — VIRTUAL VISIT (OUTPATIENT)
Dept: INTERNAL MEDICINE CLINIC | Age: 56
End: 2021-10-26
Payer: COMMERCIAL

## 2021-10-26 DIAGNOSIS — J06.9 URTI (ACUTE UPPER RESPIRATORY INFECTION): Primary | ICD-10-CM

## 2021-10-26 PROCEDURE — 99213 OFFICE O/P EST LOW 20 MIN: CPT | Performed by: NURSE PRACTITIONER

## 2021-10-26 RX ORDER — AZITHROMYCIN 250 MG/1
TABLET, FILM COATED ORAL
Qty: 6 TABLET | Refills: 0 | Status: SHIPPED | OUTPATIENT
Start: 2021-10-26 | End: 2022-03-07 | Stop reason: ALTCHOICE

## 2021-10-26 NOTE — PROGRESS NOTES
Jareth Crump is a 61 y.o. male who was seen by synchronous (real-time) audio-video technology on 10/26/2021 for Sinus Infection      Assessment & Plan:   Diagnoses and all orders for this visit:    1. URTI (acute upper respiratory infection)    May continue OTC Zyrtec 10 mg po daily. May use Flonase 2 sprays to each nostril daily. Will order  -     azithromycin (ZITHROMAX) 250 mg tablet; 2 pills po today then 1 daily x 4 days    Recommend patient have screening for Covid- 19 infection as well. Patient encouraged to call or return to office if symptoms do not improve or worsen. If experiencing severe shortness of breath or chest pain, present to the ER. Reviewed medications and side effects in detail. Reviewed plan of care with patient who acknowledges understanding and agrees. Subjective: This patient presents today with concern for sinus infection. Patient has complaints of nasal congestion with maxillary sinus pressure for about 1 week. He has had post-nasal drainage and cough productive of brown sputum, as well. He has no chest pain or shortness of breath. No fever or chills. No myalgia or loss of taste/ smell. Patient denies known sick contacts. He has tried OTC Zyrtec without relief. Patient completed two dose of Moderna Covid-19 vaccination in June 2021. Prior to Admission medications    Medication Sig Start Date End Date Taking? Authorizing Provider   ciclopirox (LOPROX) 0.77 % topical cream Apply  to affected area two (2) times a day. 7/2/21  Yes Loki Dacosta MD   metFORMIN (GLUCOPHAGE) 850 mg tablet TAKE 1 TABLET BY MOUTH TWICE A DAY WITH MEALS 5/8/21  Yes Loki Dacosta MD   fluticasone propionate (FLONASE) 50 mcg/actuation nasal spray 1 spray each nostril daily. 11/21/19  Yes Loki Dacosta MD   DULoxetine (CYMBALTA) 60 mg capsule Take 60 mg by mouth daily.    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 secondary to hearing loss       Review of Systems   Constitutional: Negative for chills, fever and malaise/fatigue. HENT: Positive for congestion and sinus pain. Respiratory: Positive for cough and sputum production. Negative for shortness of breath and wheezing. Cardiovascular: Negative. Gastrointestinal: Negative. Genitourinary: Negative. Musculoskeletal: Negative. Skin: Negative. Neurological: Negative. Endo/Heme/Allergies: Negative.         Objective:     Patient-Reported Vitals 10/26/2021   Patient-Reported Weight 280   Patient-Reported Height 6'2\"        [INSTRUCTIONS:  \"[x]\" Indicates a positive item  \"[]\" Indicates a negative item  -- DELETE ALL ITEMS NOT EXAMINED]    Constitutional: [x] Appears well-developed and well-nourished [x] No apparent distress      [] Abnormal -     Mental status: [x] Alert and awake  [x] Oriented to person/place/time [x] Able to follow commands    [] Abnormal -     Eyes:   EOM    [x]  Normal    [] Abnormal -   Sclera  [x]  Normal    [] Abnormal -          Discharge [x]  None visible   [] Abnormal -     HENT: [x] Normocephalic, atraumatic  [] Abnormal -     Neck: [x] No visualized mass [] Abnormal -     Pulmonary/Chest: [x] Respiratory effort normal   [x] No visualized signs of difficulty breathing or respiratory distress        [] Abnormal -      Musculoskeletal:           [x] Normal range of motion of neck        [] Abnormal -     Neurological:        [x] No Facial Asymmetry (Cranial nerve 7 motor function) (limited exam due to video visit)          [x] No gaze palsy        [] Abnormal -          Skin:        [x] No significant exanthematous lesions or discoloration noted on facial skin         [] Abnormal - Psychiatric:       [x] Normal Affect [] Abnormal -        [x] No Hallucinations    Other pertinent observable physical exam findings:-        We discussed the expected course, resolution and complications of the diagnosis(es) in detail. Medication risks, benefits, costs, interactions, and alternatives were discussed as indicated. I advised him to contact the office if his condition worsens, changes or fails to improve as anticipated. He expressed understanding with the diagnosis(es) and plan. Jarredwest Medel, was evaluated through a synchronous (real-time) audio-video encounter. The patient (or guardian if applicable) is aware that this is a billable service. Verbal consent to proceed has been obtained within the past 12 months. The visit was conducted pursuant to the emergency declaration under the [address] [place] [address] authority and the [employer] General Act. Patient identification was verified, and a caregiver was present when appropriate. The patient was located in a state where the provider was credentialed to provide care.       Trell Levy NP

## 2021-10-26 NOTE — PROGRESS NOTES
Health Maintenance Due   Topic Date Due    Eye Exam Retinal or Dilated  Never done    Shingrix Vaccine Age 50> (1 of 2) Never done    Flu Vaccine (1) 09/01/2021       Chief Complaint   Patient presents with    Sinus Infection     cough, runny nose, denies fever, x 4-5 days       1. Have you been to the ER, urgent care clinic since your last visit? Hospitalized since your last visit? No    2. Have you seen or consulted any other health care providers outside of the [address] [place] [address] since your last visit? Include any pap smears or colon screening. No    3) Do you have an Advance Directive on file? no    4) Are you interested in receiving information on Advance Directives? NO      Patient is accompanied by self I have received verbal consent from [address] [place] [address] to discuss any/all medical information while they are present in the room.

## 2022-03-07 ENCOUNTER — OFFICE VISIT (OUTPATIENT)
Dept: PRIMARY CARE CLINIC | Age: 57
End: 2022-03-07
Payer: COMMERCIAL

## 2022-03-07 VITALS
TEMPERATURE: 97.6 F | SYSTOLIC BLOOD PRESSURE: 112 MMHG | DIASTOLIC BLOOD PRESSURE: 80 MMHG | HEIGHT: 72 IN | WEIGHT: 283 LBS | OXYGEN SATURATION: 100 % | RESPIRATION RATE: 18 BRPM | BODY MASS INDEX: 38.33 KG/M2 | HEART RATE: 71 BPM

## 2022-03-07 DIAGNOSIS — B35.1 ONYCHOMYCOSIS: ICD-10-CM

## 2022-03-07 DIAGNOSIS — E66.01 SEVERE OBESITY (BMI 35.0-39.9) WITH COMORBIDITY (HCC): ICD-10-CM

## 2022-03-07 DIAGNOSIS — K76.0 HEPATIC STEATOSIS: ICD-10-CM

## 2022-03-07 DIAGNOSIS — E11.9 CONTROLLED TYPE 2 DIABETES MELLITUS WITHOUT COMPLICATION, WITHOUT LONG-TERM CURRENT USE OF INSULIN (HCC): Primary | ICD-10-CM

## 2022-03-07 DIAGNOSIS — R35.1 NOCTURIA MORE THAN TWICE PER NIGHT: ICD-10-CM

## 2022-03-07 LAB
ALBUMIN SERPL-MCNC: 4.1 G/DL (ref 3.5–5)
ALBUMIN/GLOB SERPL: 1 {RATIO} (ref 1.1–2.2)
ALP SERPL-CCNC: 87 U/L (ref 45–117)
ALT SERPL-CCNC: 102 U/L (ref 12–78)
ANION GAP SERPL CALC-SCNC: 2 MMOL/L (ref 5–15)
AST SERPL-CCNC: 51 U/L (ref 15–37)
BILIRUB SERPL-MCNC: 0.7 MG/DL (ref 0.2–1)
BUN SERPL-MCNC: 12 MG/DL (ref 6–20)
BUN/CREAT SERPL: 14 (ref 12–20)
CALCIUM SERPL-MCNC: 9.7 MG/DL (ref 8.5–10.1)
CHLORIDE SERPL-SCNC: 105 MMOL/L (ref 97–108)
CO2 SERPL-SCNC: 30 MMOL/L (ref 21–32)
CREAT SERPL-MCNC: 0.87 MG/DL (ref 0.7–1.3)
EST. AVERAGE GLUCOSE BLD GHB EST-MCNC: 151 MG/DL
GLOBULIN SER CALC-MCNC: 4.3 G/DL (ref 2–4)
GLUCOSE SERPL-MCNC: 97 MG/DL (ref 65–100)
HBA1C MFR BLD: 6.9 % (ref 4–5.6)
POTASSIUM SERPL-SCNC: 4.8 MMOL/L (ref 3.5–5.1)
PROT SERPL-MCNC: 8.4 G/DL (ref 6.4–8.2)
PSA SERPL-MCNC: 0.4 NG/ML (ref 0.01–4)
SODIUM SERPL-SCNC: 137 MMOL/L (ref 136–145)

## 2022-03-07 PROCEDURE — 99214 OFFICE O/P EST MOD 30 MIN: CPT | Performed by: INTERNAL MEDICINE

## 2022-03-07 RX ORDER — CICLOPIROX OLAMINE 7.7 MG/G
CREAM TOPICAL 2 TIMES DAILY
Qty: 15 G | Refills: 1 | Status: CANCELLED | OUTPATIENT
Start: 2022-03-07

## 2022-03-07 RX ORDER — CICLOPIROX OLAMINE 7.7 MG/G
CREAM TOPICAL 2 TIMES DAILY
Qty: 30 G | Refills: 1 | Status: SHIPPED | OUTPATIENT
Start: 2022-03-07 | End: 2022-06-05

## 2022-03-07 NOTE — PROGRESS NOTES
Jarred Medel (: 1965) is a 61 y.o. male, established patient, here for evaluation of the following chief complaint(s):  Diabetes Care Management  (labs)     Written by Flores Brenner, as dictated by Dr. Deja Pickens MD.      ASSESSMENT/PLAN:  Below is the assessment and plan developed based on review of pertinent history, physical exam, labs, studies, and medications. 1. Controlled type 2 diabetes mellitus without complication, without long-term current use of insulin (HCC)  Recheck hgb A1c. Continue on metformin 850 mg BID for now. -     HEMOGLOBIN A1C WITH EAG; Future    2. Onychomycosis  I refilled Loprox topical cream. Also recommended he apply Vaseline to the dry skin on his right big toe.   -     ciclopirox (LOPROX) 0.77 % topical cream; Apply  to affected area two (2) times a day for 90 days. , Normal, Disp-30 g, R-1 sent to pharmacy. 3. Hepatic steatosis  Recheck CMP. Discussed that fatty liver should improve with weight loss. -     METABOLIC PANEL, COMPREHENSIVE; Future    4. Severe obesity (BMI 35.0-39. 9) with comorbidity ([facility] [address]  Explained that he should be walking 5,000 steps daily to maintain conditioning and weight and increase to at least 10,000 steps to work on losing weight. 5. Nocturia more than twice per night  Will check PSA.  -     PSA, DIAGNOSTIC (PROSTATE SPECIFIC AG); Future     SUBJECTIVE/OBJECTIVE:  HPI   The patient presents today for a routine follow-up. He is on metformin 850 mg BID for diabetes and has been tolerating this well. He does not check his BG at home. He denies any numbness or tingling in his feet. He has lost weight from 292 lb on 21 to 283 lb today. He recently got a dog and has been taking his dog on walks every other day. He has onychomycosis on his right big toenail and requesting a refill for Loprox. He notes frequent urination which is new for him. He has nocturia as well.      Patient Active Problem List   Diagnosis Code    Allergic rhinitis J30.9    Family history of heart disease Z82.49    Obstructive sleep apnea hypopnea, mild G47.33    Hepatic steatosis K76.0        Current Outpatient Medications on File Prior to Visit   Medication Sig Dispense Refill    metFORMIN (GLUCOPHAGE) 850 mg tablet TAKE 1 TABLET BY MOUTH TWICE A DAY WITH MEALS 180 Tab 0    fluticasone propionate (FLONASE) 50 mcg/actuation nasal spray 1 spray each nostril daily. 1 Bottle 0    DULoxetine (CYMBALTA) 60 mg capsule Take 60 mg by mouth daily.  [DISCONTINUED] azithromycin (ZITHROMAX) 250 mg tablet 2 pills po today then 1 daily x 4 days 6 Tablet 0    [DISCONTINUED] ciclopirox (LOPROX) 0.77 % topical cream Apply  to affected area two (2) times a day. 15 g 0     No current facility-administered medications on file prior to visit. No Known Allergies    Past Medical History:   Diagnosis Date    Allergic rhinitis     Asthma     childhood & as adult allergic    [facility] (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 secondary to hearing loss       Family History   Problem Relation Age of Onset    Bipolar Disorder Brother        Social History     Socioeconomic History    Marital status:      Spouse name: Not on file    Number of children: Not on file    Years of education: Not on file    Highest education level: Not on file   Occupational History    Not on file   Tobacco Use    Smoking status: Never Smoker    Smokeless tobacco: Never Used   Substance and Sexual Activity    Alcohol use: Yes     Alcohol/week: 1.7 standard drinks     Types: 2 Standard drinks or equivalent per week    Drug use: No    Sexual activity: Yes     Partners: Female   Other Topics Concern    Not on file   Social History Narrative    Not on file     No visits with results within 3 Month(s) from this visit.    Latest known visit with results is:   Orders Only on 07/02/2021   Component Date Value Ref Range Status    Cholesterol, total 07/02/2021 154  <200 MG/DL Final    Triglyceride 07/02/2021 106  <150 MG/DL Final    Comment: Based on NCEP-ATP III:  Triglycerides <150 mg/dL  is considered normal, 150-199  mg/dL  borderline high,  200-499 mg/dL high and  greater than or equal to 500  mg/dL very high.  HDL Cholesterol 07/02/2021 51  MG/DL Final    Comment: Based on NCEP ATP III, HDL Cholesterol <40 mg/dL is considered low and >60  mg/dL is elevated.  LDL, calculated 07/02/2021 81.8  0 - 100 MG/DL Final    Comment: Based on the NCEP-ATP: LDL-C concentrations are considered  optimal <100 mg/dL,  near optimal/above Normal 100-129 mg/dL Borderline High: 130-159, High: 160-189  mg/dL Very High: Greater than or equal to 190 mg/dL      VLDL, calculated 07/02/2021 21.2  MG/DL Final    CHOL/HDL Ratio 07/02/2021 3.0  0.0 - 5.0   Final    Hemoglobin A1c 07/02/2021 6.2* 4.0 - 5.6 % Final    Comment: NEW METHOD PLEASE NOTE NEW REFERENCE RANGE  (NOTE)  HbA1C Interpretive Ranges  <5.7              Normal  5.7 - 6.4         Consider Prediabetes  >6.5              Consider Diabetes      Est. average glucose 07/02/2021 131  mg/dL Final    WBC 07/02/2021 6.3  4.1 - 11.1 K/uL Final    RBC 07/02/2021 4.19  4. 10 - 5.70 M/uL Final    HGB 07/02/2021 14.0  12.1 - 17.0 g/dL Final    HCT 07/02/2021 40.8  36.6 - 50.3 % Final    MCV 07/02/2021 97.4  80.0 - 99.0 FL Final    MCH 07/02/2021 33.4  26.0 - 34.0 PG Final    MCHC 07/02/2021 34.3  30.0 - 36.5 g/dL Final    RDW 07/02/2021 12.1  11.5 - 14.5 % Final    PLATELET 48/23/8493 634  150 - 400 K/uL Final    MPV 07/02/2021 11.0  8.9 - 12.9 FL Final    NRBC 07/02/2021 0.0  0  WBC Final    ABSOLUTE NRBC 07/02/2021 0.00  0.00 - 0.01 K/uL Final    Sodium 07/02/2021 137  136 - 145 mmol/L Final    Potassium 07/02/2021 4.2  3.5 - 5.1 mmol/L Final    Chloride 07/02/2021 109* 97 - 108 mmol/L Final    CO2 07/02/2021 27  21 - 32 mmol/L Final    Anion gap 07/02/2021 1* 5 - 15 mmol/L Final    Glucose 07/02/2021 80  65 - 100 mg/dL Final    BUN 07/02/2021 12  6 - 20 MG/DL Final    Creatinine 07/02/2021 0.77  0.70 - 1.30 MG/DL Final    BUN/Creatinine ratio 07/02/2021 16  12 - 20   Final    GFR est AA 07/02/2021 >60  >60 ml/min/1.73m2 Final    GFR est non-AA 07/02/2021 >60  >60 ml/min/1.73m2 Final    Comment: Estimated GFR is calculated using the IDMS-traceable Modification of Diet in  Renal Disease (MDRD) Study equation, reported for both  Americans  (GFRAA) and non- Americans (GFRNA), and normalized to 1.73m2 body  surface area. The physician must decide which value applies to the patient.  Calcium 07/02/2021 9.2  8.5 - 10.1 MG/DL Final    Bilirubin, total 07/02/2021 0.6  0.2 - 1.0 MG/DL Final    ALT (SGPT) 07/02/2021 80* 12 - 78 U/L Final    AST (SGOT) 07/02/2021 40* 15 - 37 U/L Final    Alk. phosphatase 07/02/2021 88  45 - 117 U/L Final    Protein, total 07/02/2021 8.0  6.4 - 8.2 g/dL Final    Albumin 07/02/2021 4.1  3.5 - 5.0 g/dL Final    Globulin 07/02/2021 3.9  2.0 - 4.0 g/dL Final    A-G Ratio 07/02/2021 1.1  1.1 - 2.2   Final    Microalbumin,urine random 07/02/2021 1.51  MG/DL Final    No reference range has been established.  Creatinine, urine 07/02/2021 171.00  mg/dL Final    No reference range has been established.  Microalbumin/Creat ratio (mg/g cre* 07/02/2021 9  0 - 30 mg/g Final      Review of Systems   Constitutional: Negative for activity change, fatigue and unexpected weight change. HENT: Negative for congestion, ear discharge, ear pain, hearing loss, rhinorrhea and sore throat. Eyes: Negative for pain, discharge and redness. Respiratory: Negative for cough, chest tightness and shortness of breath. Cardiovascular: Negative for leg swelling. Gastrointestinal: Negative for abdominal pain, constipation and diarrhea.    Endocrine: Negative for polyuria. Genitourinary: Positive for frequency. Negative for dysuria, flank pain and urgency. Musculoskeletal: Negative for arthralgias, back pain and myalgias. Skin: Negative for color change. Neurological: Negative for dizziness, light-headedness and headaches. Psychiatric/Behavioral: Negative for dysphoric mood and sleep disturbance. The patient is not nervous/anxious. Visit Vitals  /80 (BP 1 Location: Right arm, BP Patient Position: Sitting)   Pulse 71   Temp 97.6 °F (36.4 °C) (Temporal)   Resp 18   Ht 6' (1.829 m)   Wt 283 lb (128.4 kg)   SpO2 100%   BMI 38.38 kg/m²      Physical Exam  Vitals and nursing note reviewed. Constitutional:       General: He is not in acute distress. Appearance: Normal appearance. He is well-developed. He is not diaphoretic. HENT:      Head: Normocephalic and atraumatic. Right Ear: External ear normal.      Left Ear: External ear normal.      Mouth/Throat:      Mouth: Mucous membranes are moist.      Pharynx: Oropharynx is clear. Eyes:      General: No scleral icterus. Right eye: No discharge. Left eye: No discharge. Extraocular Movements: Extraocular movements intact. Conjunctiva/sclera: Conjunctivae normal.      Pupils: Pupils are equal, round, and reactive to light. Cardiovascular:      Rate and Rhythm: Normal rate and regular rhythm. Pulses: Normal pulses. Heart sounds: Normal heart sounds. Pulmonary:      Effort: Pulmonary effort is normal.      Breath sounds: Normal breath sounds. No wheezing. Musculoskeletal:      Right lower leg: No edema. Left lower leg: No edema. Neurological:      Mental Status: He is alert and oriented to person, place, and time. Psychiatric:         Mood and Affect: Mood and affect normal.         An electronic signature was used to authenticate this note.   -- Felipe Wolfe

## 2022-03-11 NOTE — PROGRESS NOTES
His Liver and diabetes numbers have gone up. Needs VV in next 1-2 weeks to discuss medication management.

## 2022-03-15 ENCOUNTER — VIRTUAL VISIT (OUTPATIENT)
Dept: PRIMARY CARE CLINIC | Age: 57
End: 2022-03-15
Payer: COMMERCIAL

## 2022-03-15 DIAGNOSIS — E11.65 UNCONTROLLED TYPE 2 DIABETES MELLITUS WITH HYPERGLYCEMIA (HCC): Primary | ICD-10-CM

## 2022-03-15 DIAGNOSIS — K76.0 HEPATIC STEATOSIS: ICD-10-CM

## 2022-03-15 DIAGNOSIS — E66.01 SEVERE OBESITY (HCC): ICD-10-CM

## 2022-03-15 PROCEDURE — 3044F HG A1C LEVEL LT 7.0%: CPT | Performed by: INTERNAL MEDICINE

## 2022-03-15 PROCEDURE — 99214 OFFICE O/P EST MOD 30 MIN: CPT | Performed by: INTERNAL MEDICINE

## 2022-03-15 RX ORDER — DAPAGLIFLOZIN AND METFORMIN HYDROCHLORIDE 5; 1000 MG/1; MG/1
TABLET, FILM COATED, EXTENDED RELEASE ORAL
Qty: 90 TABLET | Refills: 1 | Status: SHIPPED | OUTPATIENT
Start: 2022-03-15 | End: 2022-06-07 | Stop reason: ALTCHOICE

## 2022-03-15 NOTE — PROGRESS NOTES
Jarred Medel (: 1965) is a 61 y.o. male, established patient, here for evaluation of the following chief complaint(s):   Follow-up     Written by Jocelyn Joiner, as dictated by Dr. Ruel Medina MD.      ASSESSMENT/PLAN:  Below is the assessment and plan developed based on review of pertinent history, labs, studies, and medications. 1. Uncontrolled type 2 diabetes mellitus with hyperglycemia ([facility] [address]  Reviewed hgb A1c which has increased from 6.2% to 6.9%. We will switch him from metformin to Xigduo XR 5-1,000 mg daily. Potential side effects were discussed. -     dapagliflozin-metformin (Xigduo XR) 5-1,000 mg TBph; 1 tablet daily, Normal, Disp-90 Tablet, R-1 sent to pharmacy. 2. Hepatic steatosis  Reviewed CMP which shows liver enzymes have increased. I provided him with a referral to Dr. Harsha Harris (hepatology). In the meantime, recommended he start walking daily and cut down on carbs such as white bread, pasta, and rice. -     REFERRAL TO LIVER HEPATOLOGY    3. Severe obesity ([facility] [address]  Recommend he start walking daily and cut down on carbs such as white bread, pasta, and rice. SUBJECTIVE/OBJECTIVE:  HPI   The patient presents virtually today to review lab work from 22. His CMP showed elevated ALT at 102 and AST at 51 which have increased from 80 and 40 on 21. His hgb A1c has also increased from 6.2% on 21 to 6.9%. He is on metformin 850 mg BID for diabetes, but mentions that he occasionally forgets to take his evening dose. Patient Active Problem List   Diagnosis Code    Allergic rhinitis J30.9    Family history of heart disease Z82.49    Obstructive sleep apnea hypopnea, mild G47.33    Hepatic steatosis K76.0        Current Outpatient Medications on File Prior to Visit   Medication Sig Dispense Refill    ciclopirox (LOPROX) 0.77 % topical cream Apply  to affected area two (2) times a day for 90 days.  30 g 1    [DISCONTINUED] metFORMIN (GLUCOPHAGE) 850 mg tablet TAKE 1 TABLET BY MOUTH TWICE A DAY WITH MEALS 180 Tab 0    fluticasone propionate (FLONASE) 50 mcg/actuation nasal spray 1 spray each nostril daily. 1 Bottle 0    DULoxetine (CYMBALTA) 60 mg capsule Take 60 mg by mouth daily. No current facility-administered medications on file prior to visit. No Known Allergies    Past Medical History:   Diagnosis Date    Allergic rhinitis     Asthma     childhood & as adult allergic    Diabetes ([facility] [address]     Hearing loss in right ear     (30%) secondary to cyst    Narcolepsy 2008       Past Surgical History:   Procedure Laterality Date    HX HEENT  1990's    right ear explor. secondary to hearing loss       Family History   Problem Relation Age of Onset    Bipolar Disorder Brother        Social History     Socioeconomic History    Marital status:      Spouse name: Not on file    Number of children: Not on file    Years of education: Not on file    Highest education level: Not on file   Occupational History    Not on file   Tobacco Use    Smoking status: Never Smoker    Smokeless tobacco: Never Used   Substance and Sexual Activity    Alcohol use:  Yes     Alcohol/week: 1.7 standard drinks     Types: 2 Standard drinks or equivalent per week    Drug use: No    Sexual activity: Yes     Partners: Female   Other Topics Concern    Not on file   Social History Narrative    Not on file       Office Visit on 03/07/2022   Component Date Value Ref Range Status    Sodium 03/07/2022 137  136 - 145 mmol/L Final    Potassium 03/07/2022 4.8  3.5 - 5.1 mmol/L Final    Chloride 03/07/2022 105  97 - 108 mmol/L Final    CO2 03/07/2022 30  21 - 32 mmol/L Final    Anion gap 03/07/2022 2* 5 - 15 mmol/L Final    Glucose 03/07/2022 97  65 - 100 mg/dL Final    BUN 03/07/2022 12  6 - 20 MG/DL Final    Creatinine 03/07/2022 0.87  0.70 - 1.30 MG/DL Final    BUN/Creatinine ratio 03/07/2022 14  12 - 20   Final    GFR est AA 03/07/2022 >60  >60 ml/min/1.73m2 Final    GFR est non-AA 03/07/2022 >60  >60 ml/min/1.73m2 Final    Comment: Estimated GFR is calculated using the IDMS-traceable Modification of Diet in  Renal Disease (MDRD) Study equation, reported for both  Americans  (GFRAA) and non- Americans (GFRNA), and normalized to 1.73m2 body  surface area. The physician must decide which value applies to the patient.  Calcium 03/07/2022 9.7  8.5 - 10.1 MG/DL Final    Bilirubin, total 03/07/2022 0.7  0.2 - 1.0 MG/DL Final    ALT (SGPT) 03/07/2022 102* 12 - 78 U/L Final    AST (SGOT) 03/07/2022 51* 15 - 37 U/L Final    Alk. phosphatase 03/07/2022 87  45 - 117 U/L Final    Protein, total 03/07/2022 8.4* 6.4 - 8.2 g/dL Final    Albumin 03/07/2022 4.1  3.5 - 5.0 g/dL Final    Globulin 03/07/2022 4.3* 2.0 - 4.0 g/dL Final    A-G Ratio 03/07/2022 1.0* 1.1 - 2.2   Final    Hemoglobin A1c 03/07/2022 6.9* 4.0 - 5.6 % Final    Comment: NEW METHOD PLEASE NOTE NEW REFERENCE RANGE  (NOTE)  HbA1C Interpretive Ranges  <5.7              Normal  5.7 - 6.4         Consider Prediabetes  >6.5              Consider Diabetes      Est. average glucose 03/07/2022 151  mg/dL Final    Prostate Specific Ag 03/07/2022 0.4  0.01 - 4.0 ng/mL Final    Comment: Method used is [employer]  (NOTE)  Many types of test methods are used to measure PSA and can yield   different results on any given specimen. Therefore PSA results from   different laboratories on the same patient are not directly   comparable. In addition, PSA values by themselves should not be   interpreted as the presence or absence of cancer. PSA values used to   monitor for biochemical recurrence of prostate cancer should be   interpreted in accordance with current clinical guidelines (e.g. the   2013 American Urological Association (AUA) guidelines and the 2015    Association of Urology (EAU) guidelines).         Review of Systems   Constitutional: Negative for activity change, fatigue and unexpected weight change. HENT: Negative for congestion, ear discharge, ear pain, hearing loss, rhinorrhea and sore throat. Eyes: Negative for pain, discharge and redness. Respiratory: Negative for cough, chest tightness and shortness of breath. Cardiovascular: Negative for leg swelling. Gastrointestinal: Negative for abdominal pain, constipation and diarrhea. Endocrine: Negative for polyuria. Genitourinary: Negative for dysuria, flank pain and urgency. Musculoskeletal: Negative for arthralgias, back pain and myalgias. Skin: Negative for color change. Neurological: Negative for dizziness, light-headedness and headaches. Psychiatric/Behavioral: Negative for dysphoric mood and sleep disturbance. The patient is not nervous/anxious.          Physical Exam    [INSTRUCTIONS:  \"[x]\" Indicates a positive item  \"[]\" Indicates a negative item  -- DELETE ALL ITEMS NOT EXAMINED]    Constitutional: [x] Appears well-developed and well-nourished [x] No apparent distress      [] Abnormal -     Mental status: [x] Alert and awake  [x] Oriented to person/place/time [x] Able to follow commands    [] Abnormal -     Eyes:   EOM    [x]  Normal    [] Abnormal -   Sclera  [x]  Normal    [] Abnormal -          Discharge [x]  None visible   [] Abnormal -     HENT: [x] Normocephalic, atraumatic  [] Abnormal -   [x] Mouth/Throat: Mucous membranes are moist    External Ears [x] Normal  [] Abnormal -    Neck: [x] No visualized mass [] Abnormal -     Pulmonary/Chest: [x] Respiratory effort normal   [x] No visualized signs of difficulty breathing or respiratory distress        [] Abnormal -      Musculoskeletal:   [x] Normal gait with no signs of ataxia         [x] Normal range of motion of neck        [] Abnormal -     Neurological:        [x] No Facial Asymmetry (Cranial nerve 7 motor function) (limited exam due to video visit)          [x] No gaze palsy        [] Abnormal -          Skin:        [x] No significant exanthematous lesions or discoloration noted on facial skin         [] Abnormal -            Psychiatric:       [x] Normal Affect [] Abnormal -        [x] No Hallucinations    Other pertinent observable physical exam findings:-    Jarred Medel, was evaluated through a synchronous (real-time) audio-video encounter. The patient (or guardian if applicable) is aware that this is a billable service, which includes applicable co-pays. Verbal consent to proceed has been obtained. The visit was conducted pursuant to the emergency declaration under the [address] [place] [address], [address] [place] [address] authority and the [employer] and [place] [employer] Act. Patient identification was verified, and a caregiver was present when appropriate. The patient was located at home in a state where the provider was licensed to provide care. An electronic signature was used to authenticate this note.   -- Norma Cordova

## 2022-03-18 PROBLEM — Z82.49 FAMILY HISTORY OF HEART DISEASE: Status: ACTIVE | Noted: 2017-10-05

## 2022-03-19 PROBLEM — K76.0 HEPATIC STEATOSIS: Status: ACTIVE | Noted: 2020-05-26

## 2022-03-20 PROBLEM — G47.33 OBSTRUCTIVE SLEEP APNEA HYPOPNEA, MILD: Status: ACTIVE | Noted: 2020-02-26

## 2022-06-02 ENCOUNTER — TELEPHONE (OUTPATIENT)
Dept: PRIMARY CARE CLINIC | Age: 57
End: 2022-06-02

## 2022-06-07 DIAGNOSIS — E11.9 CONTROLLED TYPE 2 DIABETES MELLITUS WITHOUT COMPLICATION, WITHOUT LONG-TERM CURRENT USE OF INSULIN (HCC): Primary | ICD-10-CM

## 2022-06-07 RX ORDER — METFORMIN HYDROCHLORIDE 500 MG/1
500 TABLET ORAL 2 TIMES DAILY WITH MEALS
Qty: 60 TABLET | Refills: 2 | Status: SHIPPED | OUTPATIENT
Start: 2022-06-07 | End: 2022-09-02

## 2022-07-26 ENCOUNTER — OFFICE VISIT (OUTPATIENT)
Dept: HEMATOLOGY | Age: 57
End: 2022-07-26
Payer: COMMERCIAL

## 2022-07-26 VITALS
RESPIRATION RATE: 17 BRPM | BODY MASS INDEX: 38.87 KG/M2 | HEART RATE: 64 BPM | WEIGHT: 287 LBS | SYSTOLIC BLOOD PRESSURE: 139 MMHG | DIASTOLIC BLOOD PRESSURE: 77 MMHG | TEMPERATURE: 97.2 F | HEIGHT: 72 IN | OXYGEN SATURATION: 97 %

## 2022-07-26 DIAGNOSIS — R74.8 ELEVATED LIVER ENZYMES: Primary | ICD-10-CM

## 2022-07-26 PROBLEM — E11.9 TYPE 2 DIABETES MELLITUS (HCC): Status: ACTIVE | Noted: 2022-07-26

## 2022-07-26 LAB
ALBUMIN SERPL-MCNC: 4.1 G/DL (ref 3.5–5)
ALBUMIN/GLOB SERPL: 1 {RATIO} (ref 1.1–2.2)
ALP SERPL-CCNC: 81 U/L (ref 45–117)
ALT SERPL-CCNC: 88 U/L (ref 12–78)
ANION GAP SERPL CALC-SCNC: 7 MMOL/L (ref 5–15)
AST SERPL-CCNC: 43 U/L (ref 15–37)
BASOPHILS # BLD: 0.1 K/UL (ref 0–0.1)
BASOPHILS NFR BLD: 1 % (ref 0–1)
BILIRUB DIRECT SERPL-MCNC: 0.2 MG/DL (ref 0–0.2)
BILIRUB SERPL-MCNC: 0.7 MG/DL (ref 0.2–1)
BUN SERPL-MCNC: 13 MG/DL (ref 6–20)
BUN/CREAT SERPL: 15 (ref 12–20)
CALCIUM SERPL-MCNC: 9.8 MG/DL (ref 8.5–10.1)
CHLORIDE SERPL-SCNC: 105 MMOL/L (ref 97–108)
CO2 SERPL-SCNC: 27 MMOL/L (ref 21–32)
CREAT SERPL-MCNC: 0.86 MG/DL (ref 0.7–1.3)
DIFFERENTIAL METHOD BLD: NORMAL
EOSINOPHIL # BLD: 0.2 K/UL (ref 0–0.4)
EOSINOPHIL NFR BLD: 4 % (ref 0–7)
ERYTHROCYTE [DISTWIDTH] IN BLOOD BY AUTOMATED COUNT: 12.2 % (ref 11.5–14.5)
FERRITIN SERPL-MCNC: 151 NG/ML (ref 26–388)
GLOBULIN SER CALC-MCNC: 4.3 G/DL (ref 2–4)
GLUCOSE SERPL-MCNC: 96 MG/DL (ref 65–100)
HBV SURFACE AB SER QL: NONREACTIVE
HBV SURFACE AB SER-ACNC: <3.1 MIU/ML
HBV SURFACE AG SER QL: <0.1 INDEX
HBV SURFACE AG SER QL: NEGATIVE
HCT VFR BLD AUTO: 46.5 % (ref 36.6–50.3)
HGB BLD-MCNC: 15.8 G/DL (ref 12.1–17)
IMM GRANULOCYTES # BLD AUTO: 0 K/UL (ref 0–0.04)
IMM GRANULOCYTES NFR BLD AUTO: 0 % (ref 0–0.5)
INR PPP: 1 (ref 0.9–1.1)
IRON SATN MFR SERPL: 31 % (ref 20–50)
IRON SERPL-MCNC: 119 UG/DL (ref 35–150)
LYMPHOCYTES # BLD: 2.5 K/UL (ref 0.8–3.5)
LYMPHOCYTES NFR BLD: 46 % (ref 12–49)
MCH RBC QN AUTO: 33.5 PG (ref 26–34)
MCHC RBC AUTO-ENTMCNC: 34 G/DL (ref 30–36.5)
MCV RBC AUTO: 98.7 FL (ref 80–99)
MONOCYTES # BLD: 0.5 K/UL (ref 0–1)
MONOCYTES NFR BLD: 10 % (ref 5–13)
NEUTS SEG # BLD: 2.1 K/UL (ref 1.8–8)
NEUTS SEG NFR BLD: 39 % (ref 32–75)
NRBC # BLD: 0 K/UL (ref 0–0.01)
NRBC BLD-RTO: 0 PER 100 WBC
PLATELET # BLD AUTO: 194 K/UL (ref 150–400)
PMV BLD AUTO: 11.3 FL (ref 8.9–12.9)
POTASSIUM SERPL-SCNC: 4.3 MMOL/L (ref 3.5–5.1)
PROT SERPL-MCNC: 8.4 G/DL (ref 6.4–8.2)
PROTHROMBIN TIME: 10.9 SEC (ref 9–11.1)
RBC # BLD AUTO: 4.71 M/UL (ref 4.1–5.7)
SODIUM SERPL-SCNC: 139 MMOL/L (ref 136–145)
TIBC SERPL-MCNC: 385 UG/DL (ref 250–450)
WBC # BLD AUTO: 5.3 K/UL (ref 4.1–11.1)

## 2022-07-26 PROCEDURE — 99203 OFFICE O/P NEW LOW 30 MIN: CPT | Performed by: INTERNAL MEDICINE

## 2022-07-26 NOTE — Clinical Note
8/13/2022    Patient: Colt Chavez   YOB: 1965   Date of Visit: 7/26/2022     Letha Castañeda MD  [address] [place] [address]  Via In [facility] [address]    Dear Letha Castañeda MD,      Thank you for referring Mr. Jarred Medel to [address] for evaluation. My notes for this consultation are attached. If you have questions, please do not hesitate to call me. I look forward to following your patient along with you.       Sincerely,    Denzel Hart MD

## 2022-07-26 NOTE — PROGRESS NOTES
Identified pt with two pt identifiers(name and ). Reviewed record in preparation for visit and have obtained necessary documentation. Chief Complaint   Patient presents with    New Patient     Establish care      Vitals:    22 0901   BP: 139/77   Pulse: 64   Resp: 17   Temp: 97.2 °F (36.2 °C)   TempSrc: Temporal   SpO2: 97%   Weight: 287 lb (130.2 kg)   Height: 6' (1.829 m)   PainSc:   0 - No pain       Health Maintenance Review: Patient reminded of \"due or due soon\" health maintenance. I have asked the patient to contact his/her primary care provider (PCP) for follow-up on his/her health maintenance. Coordination of Care Questionnaire:  :   1) Have you been to an emergency room, urgent care, or hospitalized since your last visit? If yes, where when, and reason for visit? no       2. Have seen or consulted any other health care provider since your last visit? If yes, where when, and reason for visit? NO      Patient is accompanied by self I have received verbal consent from [address] [place] [address] to discuss any/all medical information while they are present in the room.

## 2022-07-26 NOTE — PROGRESS NOTES
[address] [facility] [place], [place], [address], [place], [place]      TIFFANY Esquivel [facility]     April S Austin, [facility]   VETO Ozuna-C Luwanna Closs, [facility]       Dada Torres [facility] 136    at [address]    [address] [place] [address], [address] Alice Walker  22.    [phone]    FAX: [address] [place] [address] Avenue    [address], [address]    [phone]    FAX: [phone]       Patient Care Team:  Mark Celeste MD as PCP - General (Internal Medicine Physician)  Mark Celeste MD as PCP - [address] [place] [address] Dr Brewster Provider      Problem List  Date Reviewed: 3/15/2022            Codes Class Noted    Hepatic steatosis ICD-10-CM: K76.0  ICD-9-CM: 571.8  5/26/2020        Obstructive sleep apnea hypopnea, mild ICD-10-CM: G47.33  ICD-9-CM: 327.23  2/26/2020        Family history of heart disease ICD-10-CM: Z82.49  ICD-9-CM: V17.49  10/5/2017        Allergic rhinitis ICD-10-CM: J30.9  ICD-9-CM: 477.9  5/1/2012           The clinicians listed above have asked me to see Shasta France in consultation regarding elevated liver enzymes and its management. All medical records sent by the referring physicians were reviewed including imaging studies     The patient is a 61 y.o.  male who was found to have elevated liver transaminases in 12/2018. Serologic evaluation for markers of chronic liver disease has either not been performed or the results are not available. Ultrasound of the liver was performed in 1/2019. The results of the imaging suggested chronic liver disease. An assessment of liver fibrosis with biopsy, Fibroscan or elastography has not been performed. The patient does not have any symptoms which could be attributed to the liver disorder.     The patient is not experiencing the following symptoms which are commonly seen in this liver disorder:   fatigue,   pain in the right side over the liver,     The patient completes all daily activities without any functional limitations. ASSESSMENT AND PLAN:  Elevated liver enzymes  Persistent elevation in liver transaminases of unclear etiology at this time. Have performed laboratory testing to monitor liver function and degree of liver injury. This included BMP, hepatic panel, CBC with platelet count, INR. Liver transaminases are elevated. ALP is normal.  Liver function is normal.  The platelet count is normal.      Based upon laboratory studies and imaging  the patient does not appear to have advanced liver disease. Serologic testing for causes of chronic liver disease was ordered. All was negative     The most likely causes for the liver chemistry abnormalities were discussed with the patient and include fatty liver disease,     The need to perform an assessment of liver fibrosis was discussed with the patient. The Fibroscan can assess liver fibrosis and determine if a patient has advanced fibrosis or cirrhosis without the need for liver biopsy. This will be performed at the next office visit. If the Fibroscan suggests advanced fibrosis then a liver biopsy should be considered. The Fibroscan can be repeated annually or as often as clinically indicated to assess for fibrosis progression and/or regression    Screening for Hepatocellular Carcinoma  HCC screening is not necessary if the patient has no evidence of cirrhosis. Treatment of other medical problems in patients with chronic liver disease  There are no contraindications for the patient to take most medications that are necessary for treatment of other medical issues.     Counseling for alcohol in patients with chronic liver disease  The patient was counseled regarding alcohol consumption and the effect of alcohol on chronic liver disease. The patient does not consume any significant amount of alcohol. Vaccinations   Vaccination for viral hepatitis A and B is recommended since the patient has no serologic evidence of previous exposure or vaccination with immunity. The patient has received 2 doses of COVID-19 vaccine. Routine vaccinations against other bacterial and viral agents can be performed as indicated. Annual flu vaccination should be administered if indicated. ALLERGIES  No Known Allergies    MEDICATIONS  Current Outpatient Medications   Medication Sig    Farxiga 5 mg tab tablet TAKE 1 TABLET BY MOUTH EVERY DAY    metFORMIN (GLUCOPHAGE) 500 mg tablet Take 1 Tablet by mouth two (2) times daily (with meals) for 90 days. fluticasone propionate (FLONASE) 50 mcg/actuation nasal spray 1 spray each nostril daily. DULoxetine (CYMBALTA) 60 mg capsule Take 60 mg by mouth daily. No current facility-administered medications for this visit. SYSTEM REVIEW NOT RELATED TO LIVER DISEASE OR REVIEWED ABOVE:  Constitution systems: Negative for fever, chills, weight gain, weight loss. Eyes: Negative for visual changes. ENT: Negative for sore throat, painful swallowing. Respiratory: Negative for cough, hemoptysis, SOB. Cardiology: Negative for chest pain, palpitations. GI:  Negative for constipation or diarrhea. : Negative for urinary frequency, dysuria, hematuria, nocturia. Skin: Negative for rash. Hematology: Negative for easy bruising, blood clots. Musculo-skelatal: Negative for back pain, muscle pain, weakness. Neurologic: Negative for headaches, dizziness, vertigo, memory problems not related to HE. Psychology: Negative for anxiety, depression. FAMILY HISTORY:  The father Has/had the following following chronic disease(s): None. The mother Has/had the following chronic disease(s): None. There is no family history of liver disease. SOCIAL HISTORY:  The patient is . The patient has 2 children,   The patient has never used tobacco products. The patient consumes 2-4 alcoholic beverages per week. 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 PHYSICAL EXAMINATION:  Visit Vitals  /77 (BP 1 Location: Left upper arm, BP Patient Position: Sitting, BP Cuff Size: Large adult)   Pulse 64   Temp 97.2 °F (36.2 °C) (Temporal)   Resp 17   Ht 6' (1.829 m)   Wt 287 lb (130.2 kg)   SpO2 97%   BMI 38.92 kg/m²     General: No acute distress. Eyes: Sclera anicteric. ENT: No oral lesions. Thyroid normal.  Nodes: No adenopathy. Skin: No spider angiomata. No jaundice. No palmar erythema. Respiratory: Lungs clear to auscultation. Cardiovascular: Regular heart rate. No murmurs. No JVD. Abdomen: Soft non-tender. Liver size normal to percussion/palpation. Spleen not palpable. No obvious ascites. Extremities: No edema. No muscle wasting. No gross arthritic changes. Neurologic: Alert and oriented. Cranial nerves grossly intact. No asterixis.     LABORATORY STUDIES:  Liver [place] [facility] [address] Units 7/26/2022 3/7/2022   WBC 4.1 - 11.1 K/uL 5.3    ANC 1.8 - 8.0 K/UL 2.1    HGB 12.1 - 17.0 g/dL 15.8     - 400 K/uL 194    INR 0.9 - 1.1   1.0    AST 15 - 37 U/L 43 (H) 51 (H)   ALT 12 - 78 U/L 88 (H) 102 (H)   Alk Phos 45 - 117 U/L 81 87   Bili, Total 0.2 - 1.0 MG/DL 0.7 0.7   Bili, Direct 0.0 - 0.2 MG/DL 0.2    Albumin 3.5 - 5.0 g/dL 4.1 4.1   BUN 6 - 20 MG/DL 13 12   Creat 0.70 - 1.30 MG/DL 0.86 0.87   Na 136 - 145 mmol/L 139 137   K 3.5 - 5.1 mmol/L 4.3 4.8   Cl 97 - 108 mmol/L 105 105   CO2 21 - 32 mmol/L 27 30   Glucose 65 - 100 mg/dL 96 97     SEROLOGIES:  Serologies Latest Ref Rng & Units 7/26/2022   Hep A Ab, Total Negative   Positive (A)   Hep B Surface Ag Index <0.10   Hep B Surface Ag Interp Negative   Negative   Hep B Core Ab, Total Negative   Negative   Hep B Surface Ab mIU/mL <3.10   Hep B Surface Ab Interp NONREACTIVE   NONREACTIVE   Hep C Ab 0.0 - 0.9 s/co ratio <0.1   Ferritin 26 - 388 NG/   Iron % Saturation 20 - 50 % 31   SUSAN, IFA  Negative   ASMCA 0 - 19 Units 6   Ceruloplasmin 16.0 - 31.0 mg/dL 20.3   Alpha-1 antitrypsin level 101 - 187 mg/dL 144       LIVER HISTOLOGY:  Not available or performed    ENDOSCOPIC PROCEDURES:  Not available or performed    RADIOLOGY:  1/2019. Ultrasound of liver. Echogenic consistent with fatty liver. No liver mass lesions. No dilated bile ducts. No ascites. OTHER TESTING:  Not available or performed    FOLLOW-UP:  All of the issues listed above in the Assessment and Plan were discussed with the patient. All questions were answered. The patient expressed a clear understanding of the above. [address] [place] [address] in 3 months for Fibroscan to review all data and determine the treatment plan.       Holland Hawk MD  [facility] of [address] [facility] 22.  [phone]  [address] [place] [address]

## 2022-07-27 LAB
A1AT SERPL-MCNC: 144 MG/DL (ref 101–187)
CERULOPLASMIN SERPL-MCNC: 20.3 MG/DL (ref 16–31)
HAV AB SER QL IA: POSITIVE
HBV CORE AB SERPL QL IA: NEGATIVE
HCV AB S/CO SERPL IA: <0.1 S/CO RATIO (ref 0–0.9)
HCV AB SERPL QL IA: NORMAL

## 2022-07-28 LAB — SMA IGG SER-ACNC: 6 UNITS (ref 0–19)

## 2022-08-01 LAB — ANA TITR SER IF: NEGATIVE {TITER}

## 2022-08-24 ENCOUNTER — HOSPITAL ENCOUNTER (OUTPATIENT)
Dept: ULTRASOUND IMAGING | Age: 57
Discharge: HOME | End: 2022-08-24
Attending: INTERNAL MEDICINE
Payer: COMMERCIAL

## 2022-08-24 DIAGNOSIS — R74.8 ELEVATED LIVER ENZYMES: ICD-10-CM

## 2022-08-24 PROCEDURE — 76981 USE PARENCHYMA: CPT

## 2022-09-02 DIAGNOSIS — E11.9 CONTROLLED TYPE 2 DIABETES MELLITUS WITHOUT COMPLICATION, WITHOUT LONG-TERM CURRENT USE OF INSULIN (HCC): ICD-10-CM

## 2022-09-02 RX ORDER — METFORMIN HYDROCHLORIDE 500 MG/1
500 TABLET ORAL 2 TIMES DAILY WITH MEALS
Qty: 180 TABLET | Refills: 0 | Status: SHIPPED | OUTPATIENT
Start: 2022-09-02 | End: 2022-12-01

## 2022-11-22 ENCOUNTER — TELEPHONE (OUTPATIENT)
Dept: PRIMARY CARE CLINIC | Age: 57
End: 2022-11-22

## 2022-11-22 NOTE — TELEPHONE ENCOUNTER
Spoke to pt and told him we have him on the schedule today at 3:30pm with Dr. Melodie Marte. He said, \"oh m g.\" I said did you forget and he said well my wife told me but it didn't stick. I told him to call back and re-schedule. He asked if he can re-schedule now.  He is re-schedule for December 6th at 2:45pm.

## 2022-11-25 DIAGNOSIS — E11.9 CONTROLLED TYPE 2 DIABETES MELLITUS WITHOUT COMPLICATION, WITHOUT LONG-TERM CURRENT USE OF INSULIN (HCC): ICD-10-CM

## 2022-11-25 RX ORDER — METFORMIN HYDROCHLORIDE 500 MG/1
500 TABLET ORAL 2 TIMES DAILY WITH MEALS
Qty: 180 TABLET | Refills: 0 | Status: SHIPPED | OUTPATIENT
Start: 2022-11-25 | End: 2023-02-23

## 2022-12-06 ENCOUNTER — OFFICE VISIT (OUTPATIENT)
Dept: PRIMARY CARE CLINIC | Age: 57
End: 2022-12-06
Payer: COMMERCIAL

## 2022-12-06 VITALS
OXYGEN SATURATION: 96 % | SYSTOLIC BLOOD PRESSURE: 105 MMHG | RESPIRATION RATE: 18 BRPM | TEMPERATURE: 97.8 F | BODY MASS INDEX: 38.79 KG/M2 | HEART RATE: 56 BPM | DIASTOLIC BLOOD PRESSURE: 67 MMHG | WEIGHT: 286.4 LBS | HEIGHT: 72 IN

## 2022-12-06 DIAGNOSIS — K76.0 HEPATIC STEATOSIS: ICD-10-CM

## 2022-12-06 DIAGNOSIS — Z00.00 PHYSICAL EXAM: Primary | ICD-10-CM

## 2022-12-06 DIAGNOSIS — Z23 NEEDS FLU SHOT: ICD-10-CM

## 2022-12-06 DIAGNOSIS — E11.9 CONTROLLED TYPE 2 DIABETES MELLITUS WITHOUT COMPLICATION, WITHOUT LONG-TERM CURRENT USE OF INSULIN (HCC): ICD-10-CM

## 2022-12-06 DIAGNOSIS — E66.01 SEVERE OBESITY (HCC): ICD-10-CM

## 2022-12-06 DIAGNOSIS — B35.1 ONYCHOMYCOSIS OF GREAT TOE: ICD-10-CM

## 2022-12-06 DIAGNOSIS — Z23 NEED FOR SHINGLES VACCINE: ICD-10-CM

## 2022-12-06 PROCEDURE — 90686 IIV4 VACC NO PRSV 0.5 ML IM: CPT | Performed by: INTERNAL MEDICINE

## 2022-12-06 PROCEDURE — 99214 OFFICE O/P EST MOD 30 MIN: CPT | Performed by: INTERNAL MEDICINE

## 2022-12-06 PROCEDURE — 99396 PREV VISIT EST AGE 40-64: CPT | Performed by: INTERNAL MEDICINE

## 2022-12-06 PROCEDURE — 90471 IMMUNIZATION ADMIN: CPT | Performed by: INTERNAL MEDICINE

## 2022-12-06 RX ORDER — ZOSTER VACCINE RECOMBINANT, ADJUVANTED 50 MCG/0.5
0.5 KIT INTRAMUSCULAR ONCE
Qty: 0.5 ML | Refills: 0 | Status: SHIPPED | OUTPATIENT
Start: 2022-12-06 | End: 2022-12-06

## 2022-12-06 RX ORDER — CICLOPIROX OLAMINE 7.7 MG/G
CREAM TOPICAL 2 TIMES DAILY
Qty: 30 G | Refills: 1 | Status: SHIPPED | OUTPATIENT
Start: 2022-12-06 | End: 2023-03-06

## 2022-12-06 NOTE — PROGRESS NOTES
Jarred Medel (: 1965) is a 61 y.o. male, established patient, here for evaluation of the following chief complaint(s):  Physical (Singles and pneumonia vaccine. )       ASSESSMENT/PLAN:  Below is the assessment and plan developed based on review of pertinent history, physical exam, labs, studies, and medications. 1. Physical exam  -     TSH 3RD GENERATION; Future  I ordered blood work to check his TSH    2. Needs flu shot  -     INFLUENZA, FLUARIX, FLULAVAL, FLUZONE (AGE 6 MO+), AFLURIA(AGE 3Y+) IM, PF, 0.5 ML  Flu shot was administered today. 3. Need for shingles vaccine  -     varicella-zoster recombinant, PF, (Shingrix, PF,) 50 mcg/0.5 mL susr injection; 0.5 mL by IntraMUSCular route once for 1 dose., Normal, Disp-0.5 mL, R-0  I ordered the shingles vaccine. 4. Controlled type 2 diabetes mellitus without complication, without long-term current use of insulin ([facility])  -      DIABETES FOOT EXAM  -     HEMOGLOBIN A1C WITH EAG; Future  -     METABOLIC PANEL, COMPREHENSIVE; Future  -     CBC W/O DIFF; Future  -     LIPID PANEL; Future  -     MICROALBUMIN, UR, RAND W/ MICROALB/CREAT RATIO; Future  -     semaglutide (OZEMPIC) 0.25 mg or 0.5 mg/dose (2 mg/1.5 ml) subq pen; 0.5 mg by SubCUTAneous route every seven (7) days for 60 days. , Normal, Disp-1 Box, R-1. I ordered a CBC, CMP, lipid panel, and blood work for Hgb A1c and Microalbumin. I recommend that he continue Farxiga 5 mg daily and metformin 500 mg BID. I recommend starting Ozempic 50 mcg/0.5 ml mg weekly. Potential side effects were discussed. 5. Hepatic steatosis  He is followed by Dr. Rick Gomes (Hepatology). He is diagnosed with moderate liver steatosis. 6. Severe obesity ([address]  Recommend the patient buy a FitBit to monitor steps. Explained that he should be walking 5,000 steps daily to maintain conditioning and weight and increase to at least 10,000 steps to work on losing weight.      7. Onychomycosis of great toe  - ciclopirox (LOPROX) 0.77 % topical cream; Apply  to affected area two (2) times a day for 90 days. , Normal, Disp-30 g, R-1 sent to pharmacy. I prescribed ciclopirox cream to be applied BID. Potential side effects were discussed. SUBJECTIVE/OBJECTIVE:  HPI  Patient presents today for an office visit and a physical.  He is not fasting today. He has not lost much weight. He states that he is active but does not exercise. He believes that he does at least 5k steps daily. His Hepatologist, Dr Hilda Vallecillo, performed an ultrasound and diagnosed him with moderate liver steatosis. He is taking Farxiga 5 mg and metformin 500 mg. He does not use CPAP. He states that his appetite is fine. He drinks alcohol several times a week. He denies constipation, heart burn, or urinary issues. He has had one COVID booster. He is interested in getting the shingles vaccine. Patient Active Problem List   Diagnosis Code    Allergic rhinitis J30.9    Family history of heart disease Z82.49    Obstructive sleep apnea hypopnea, mild G47.33    Hepatic steatosis K76.0    Type 2 diabetes mellitus E11.9        Current Outpatient Medications on File Prior to Visit   Medication Sig Dispense Refill    Farxiga 5 mg tab tablet TAKE 1 TABLET BY MOUTH EVERY DAY 30 Tablet 0    metFORMIN (GLUCOPHAGE) 500 mg tablet TAKE 1 TABLET BY MOUTH TWO (2) TIMES DAILY (WITH MEALS) FOR 90 DAYS. 180 Tablet 0    fluticasone propionate (FLONASE) 50 mcg/actuation nasal spray 1 spray each nostril daily. 1 Bottle 0    DULoxetine (CYMBALTA) 60 mg capsule Take 60 mg by mouth daily. (Patient not taking: Reported on 12/6/2022)       No current facility-administered medications on file prior to visit.        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 secondary to hearing loss       Family History   Problem Relation Age of Onset    Bipolar Disorder Brother        Social History     Socioeconomic History    Marital status:      Spouse name: Not on file    Number of children: Not on file    Years of education: Not on file    Highest education level: Not on file   Occupational History    Not on file   Tobacco Use    Smoking status: Never    Smokeless tobacco: Never   Vaping Use    Vaping Use: Never used   Substance and Sexual Activity    Alcohol use: Yes     Alcohol/week: 1.7 standard drinks     Types: 2 Standard drinks or equivalent per week    Drug use: Never    Sexual activity: Yes     Partners: Female   Other Topics Concern    Not on file   Social History Narrative    Not on file     Social Determinants of Health     Financial Resource Strain: Not on file   Food Insecurity: Not on file   Transportation Needs: Not on file   Physical Activity: Not on file   Stress: Not on file   Social Connections: Not on file   Intimate Partner Violence: Not on file   Housing Stability: Not on file       No visits with results within 3 Month(s) from this visit. Latest known visit with results is:   Office Visit on 07/26/2022   Component Date Value Ref Range Status    Ceruloplasmin 07/26/2022 20.3  16.0 - 31.0 mg/dL Final    Comment: (NOTE)  Performed At: [facility] & [address] [id number]  Bridget Benson MD DJ:[id number]      Actin (Smooth Muscle) Ab 07/26/2022 6  0 - 19 Units Final    Comment: (NOTE)                  Negative                     0 - 19                  Weak positive               20 - 30                  Moderate to strong positive     >30  Actin Antibodies are found in 52-85% of patients with  autoimmune hepatitis or chronic active hepatitis and  in 22% of patients with primary biliary cirrhosis.   Performed At: [facility]  [address] [place], [place] [id number]  Lila Salguero MD JACKMAN:[id number]      Iron 07/26/2022 119  35 - 150 ug/dL Final    TIBC 07/26/2022 385  250 - 450 ug/dL Final    Iron % saturation 07/26/2022 31  20 - 50 % Final    Ferritin 07/26/2022 151  26 - 388 NG/ML Final    HCV Ab 07/26/2022 <0.1  0.0 - 0.9 s/co ratio Final    Comment: (NOTE)  Performed At: [facility] & [address] [id number]  Lila Salguero MD PK:[id number]      Hepatitis B surface Ab 07/26/2022 <3.10  mIU/mL Final    Hep B surface Ab Interp. 07/26/2022 NONREACTIVE  NONREACTIVE   Final    Comment: (NOTE)  The ADVIA Centaur Anti-HBs2 assay is traceable to the World Health   Organization [facility]) Hepatitis B Immunoglobulin 1st International   Reference Preparation (1977). Samples with a calculated value of 10   mIU/mL or greater are considered reactive (protective) in accordance   with the CDC guidelines. The accepted criteria for immunity to HBV is   anti-HBs activity greater than or equal to 10 mIU/mL, as defined by   the [facility] International Reference Preparation. Assay performance has not been established in pregnant women,   patients who are immunosuppressed or immunocompromised, nor have   performance characteristics been established in conjunction with   other 's assays for specific HBV serologic markers. This   assay does not differentiate between vaccine induced immune response   and a response due to infection with HBV. Passively acquired anti-HBs   may be identified following patient transfusion, receipt of   immunoglobulin products, etc.      Hepatitis B surface Ag 07/26/2022 <0.10  Index Final    Hep B surface Ag Interp. 07/26/2022 Negative  Negative   Final    INR 07/26/2022 1.0  0.9 - 1.1   Final    A single therapeutic range for Vit K antagonists may not be optimal for all indications - see June, 2008 issue of Chest, American College of Chest Physicians Evidence-Based Clinical Practice Guidelines, 8th Edition. Prothrombin time 07/26/2022 10.9  9.0 - 11.1 sec Final    WBC 07/26/2022 5.3  4.1 - 11.1 K/uL Final    RBC 07/26/2022 4.71  4.10 - 5.70 M/uL Final    HGB 07/26/2022 15.8  12.1 - 17.0 g/dL Final    HCT 07/26/2022 46.5  36.6 - 50.3 % Final    MCV 07/26/2022 98.7  80.0 - 99.0 FL Final    MCH 07/26/2022 33.5  26.0 - 34.0 PG Final    MCHC 07/26/2022 34.0  30.0 - 36.5 g/dL Final    RDW 07/26/2022 12.2  11.5 - 14.5 % Final    PLATELET 53/42/1400 972  150 - 400 K/uL Final    MPV 07/26/2022 11.3  8.9 - 12.9 FL Final    NRBC 07/26/2022 0.0  0  WBC Final    ABSOLUTE NRBC 07/26/2022 0.00  0.00 - 0.01 K/uL Final    NEUTROPHILS 07/26/2022 39  32 - 75 % Final    LYMPHOCYTES 07/26/2022 46  12 - 49 % Final    MONOCYTES 07/26/2022 10  5 - 13 % Final    EOSINOPHILS 07/26/2022 4  0 - 7 % Final    BASOPHILS 07/26/2022 1  0 - 1 % Final    IMMATURE GRANULOCYTES 07/26/2022 0  0.0 - 0.5 % Final    ABS. NEUTROPHILS 07/26/2022 2.1  1.8 - 8.0 K/UL Final    ABS. LYMPHOCYTES 07/26/2022 2.5  0.8 - 3.5 K/UL Final    ABS. MONOCYTES 07/26/2022 0.5  0.0 - 1.0 K/UL Final    ABS. EOSINOPHILS 07/26/2022 0.2  0.0 - 0.4 K/UL Final    ABS. BASOPHILS 07/26/2022 0.1  0.0 - 0.1 K/UL Final    ABS. IMM.  GRANS. 07/26/2022 0.0  0.00 - 0.04 K/UL Final    DF 07/26/2022 AUTOMATED    Final    Sodium 07/26/2022 139  136 - 145 mmol/L Final    Potassium 07/26/2022 4.3  3.5 - 5.1 mmol/L Final    Chloride 07/26/2022 105  97 - 108 mmol/L Final    CO2 07/26/2022 27  21 - 32 mmol/L Final    Anion gap 07/26/2022 7  5 - 15 mmol/L Final    Glucose 07/26/2022 96  65 - 100 mg/dL Final    BUN 07/26/2022 13  6 - 20 MG/DL Final    Creatinine 07/26/2022 0.86  0.70 - 1.30 MG/DL Final    BUN/Creatinine ratio 07/26/2022 15  12 - 20   Final    GFR est AA 07/26/2022 >60  >60 ml/min/1.73m2 Final    GFR est non-AA 07/26/2022 >60  >60 ml/min/1.73m2 Final    Estimated GFR is calculated using the IDMS-traceable Modification of Diet in Renal Disease (MDRD) Study equation, reported for both  Americans (GFRAA) and non- Americans (GFRNA), and normalized to 1.73m2 body surface area. The physician must decide which value applies to the patient. Calcium 07/26/2022 9.8  8.5 - 10.1 MG/DL Final    Protein, total 07/26/2022 8.4 (A)  6.4 - 8.2 g/dL Final    Albumin 07/26/2022 4.1  3.5 - 5.0 g/dL Final    Globulin 07/26/2022 4.3 (A)  2.0 - 4.0 g/dL Final    A-G Ratio 07/26/2022 1.0 (A)  1.1 - 2.2   Final    Bilirubin, total 07/26/2022 0.7  0.2 - 1.0 MG/DL Final    Bilirubin, direct 07/26/2022 0.2  0.0 - 0.2 MG/DL Final    Alk. phosphatase 07/26/2022 81  45 - 117 U/L Final    AST (SGOT) 07/26/2022 43 (A)  15 - 37 U/L Final    ALT (SGPT) 07/26/2022 88 (A)  12 - 78 U/L Final    Alpha-1 Antitrypsin 07/26/2022 144  101 - 187 mg/dL Final    Comment: (NOTE)  Performed At: [facility], [place] [id number]  Jillian Esteban MD GS:[id number]      Antinuclear Abs, IFA 07/26/2022 Negative    Final    Comment: (NOTE)                                      Negative   <1:80                                      Borderline  1:80                                      Positive   >1:80  ICAP nomenclature: AC-0  For more information about Hep-2 cell patterns use  ANApatterns. org, the official website for the International  Consensus on Antinuclear Antibody (SUSAN) Patterns (ICAP).   Performed At: [facility], [place] [id number]  Jillian Esteban MD RS:[id number]      Hep A Ab, total 07/26/2022 Positive (A)  Negative   Final    Comment: (NOTE)  Performed At: [facility], [place] [id number]  Jillian Esteban MD SL:[id number]      Hep B Core Ab, total 07/26/2022 Negative  Negative   Final    Comment: (NOTE)  Performed At: [facility]  [address], [place] [id number]  Jillian Esteban MD ZE:[id number]      HCV Interpretation 07/26/2022 Comment    Final    Comment: (NOTE)  Negative  Not infected with HCV, unless recent infection is suspected or  other evidence exists to indicate HCV infection. Performed At: [facility] & [address] [id number]  Vahid Ribera MD BRIANNA:[id number]         Review of Systems   Constitutional:  Negative for activity change, fatigue and unexpected weight change. HENT:  Negative for congestion, ear discharge, ear pain, hearing loss, rhinorrhea and sore throat. Eyes:  Negative for pain, discharge and redness. Respiratory:  Negative for cough, chest tightness and shortness of breath. Cardiovascular:  Negative for leg swelling. Gastrointestinal:  Negative for abdominal pain, constipation and diarrhea. Endocrine: Negative for polyuria. Genitourinary:  Negative for dysuria, flank pain and urgency. Musculoskeletal:  Negative for arthralgias, back pain and myalgias. Skin:  Negative for color change. Neurological:  Negative for dizziness, light-headedness and headaches. Psychiatric/Behavioral:  Negative for dysphoric mood and sleep disturbance. The patient is not nervous/anxious. Visit Vitals  /67 (BP 1 Location: Right upper arm, BP Patient Position: Sitting)   Pulse (!) 56   Temp 97.8 °F (36.6 °C) (Temporal)   Resp 18   Ht 6' (1.829 m)   Wt 286 lb 6.4 oz (129.9 kg)   SpO2 96%   BMI 38.84 kg/m²       Physical Exam  Vitals and nursing note reviewed. Constitutional:       General: He is not in acute distress. Appearance: Normal appearance. He is obese. He is not diaphoretic. HENT:      Head: Normocephalic and atraumatic. Right Ear: Tympanic membrane, ear canal and external ear normal.      Left Ear: Tympanic membrane, ear canal and external ear normal.      Mouth/Throat:      Mouth: Mucous membranes are moist.      Pharynx: Oropharynx is clear. Eyes:      General: No scleral icterus. Right eye: No discharge. Left eye: No discharge. Extraocular Movements: Extraocular movements intact.       Conjunctiva/sclera: Conjunctivae normal.   Neck:      Thyroid: No thyromegaly. Cardiovascular:      Rate and Rhythm: Normal rate and regular rhythm. Pulses: Normal pulses. Dorsalis pedis pulses are 2+ on the right side and 2+ on the left side. Pulmonary:      Effort: Pulmonary effort is normal.      Breath sounds: Normal breath sounds. No wheezing. Abdominal:      General: Bowel sounds are normal. There is no distension. Palpations: Abdomen is soft. Tenderness: There is no abdominal tenderness. Musculoskeletal:      Cervical back: Normal range of motion and neck supple. Right lower leg: No edema. Left lower leg: No edema. Comments: B/L knees without crepitus   Feet:      Comments: Diabetic foot exam:     Left: Vibratory sensation normal    Sharp/dull discrimination normal    Filament test normal sensation with micro filament   Pulse DP: 2+ (normal)   Deformities: None  Right: Vibratory sensation normal   Sharp/dull discrimination normal   Filament test normal sensation with micro filament   Pulse DP: 2+ (normal)   Deformities: None    Lymphadenopathy:      Cervical: No cervical adenopathy. Neurological:      Deep Tendon Reflexes:      Reflex Scores:       Patellar reflexes are 2+ on the right side and 2+ on the left side. Alcon Delong MD, personally performed the services described in this documentation as scribed by Uriah Lopez in my presence, and it is both accurate and complete. An electronic signature was used to authenticate this note.   -- Uriah Lopez

## 2022-12-06 NOTE — PROGRESS NOTES
1. \"Have you been to the ER, urgent care clinic since your last visit? Hospitalized since your last visit? \" No    2. \"Have you seen or consulted any other health care providers outside of the [address] [place] [address] since your last visit? \" No     3. For patients aged 39-70: Has the patient had a colonoscopy / FIT/ Cologuard? Yes - no Care Gap present    . Chief Complaint   Patient presents with    Physical     Singles and pneumonia vaccine.

## 2022-12-13 ENCOUNTER — OFFICE VISIT (OUTPATIENT)
Dept: HEMATOLOGY | Age: 57
End: 2022-12-13
Payer: COMMERCIAL

## 2022-12-13 VITALS
HEIGHT: 72 IN | WEIGHT: 282 LBS | OXYGEN SATURATION: 98 % | BODY MASS INDEX: 38.19 KG/M2 | HEART RATE: 61 BPM | SYSTOLIC BLOOD PRESSURE: 110 MMHG | TEMPERATURE: 97.4 F | DIASTOLIC BLOOD PRESSURE: 74 MMHG

## 2022-12-13 DIAGNOSIS — K76.0 HEPATIC STEATOSIS: Primary | ICD-10-CM

## 2022-12-13 DIAGNOSIS — E11.9 TYPE 2 DIABETES MELLITUS WITHOUT COMPLICATION, WITHOUT LONG-TERM CURRENT USE OF INSULIN (HCC): ICD-10-CM

## 2022-12-13 PROCEDURE — 99214 OFFICE O/P EST MOD 30 MIN: CPT | Performed by: NURSE PRACTITIONER

## 2022-12-13 PROCEDURE — 3044F HG A1C LEVEL LT 7.0%: CPT | Performed by: NURSE PRACTITIONER

## 2022-12-13 PROCEDURE — 91200 LIVER ELASTOGRAPHY: CPT | Performed by: NURSE PRACTITIONER

## 2022-12-13 NOTE — PROGRESS NOTES
Identified pt with two pt identifiers(name and ). Reviewed record in preparation for visit and have obtained necessary documentation. Chief Complaint   Patient presents with    Follow-up     FIBROSCAN      Vitals:    22 1310   BP: 110/74   Pulse: 61   Temp: 97.4 °F (36.3 °C)   TempSrc: Temporal   SpO2: 98%   Weight: 282 lb (127.9 kg)   Height: 6' (1.829 m)   PainSc:   0 - No pain       Health Maintenance Review: Patient reminded of \"due or due soon\" health maintenance. I have asked the patient to contact his/her primary care provider (PCP) for follow-up on his/her health maintenance. Coordination of Care Questionnaire:  :   1) Have you been to an emergency room, urgent care, or hospitalized since your last visit? If yes, where when, and reason for visit? no       2. Have seen or consulted any other health care provider since your last visit? If yes, where when, and reason for visit? NO      Patient is accompanied by self I have received verbal consent from [address] [place] [address] to discuss any/all medical information while they are present in the room.

## 2022-12-13 NOTE — PROGRESS NOTES
[address] [facility], MD, [address], [facility], [place]      Inna TIFFANY Rivero, [facility]     Marichuy Avila, [facility]   RAMANA Cabrera, [facility]       Dada Torres [facility] [address]    at [address], [facility] Alice Walker  22.    [phone]    FAX: [address] [place] [address] Avenue    [facility]    [address], [address] [place] [address], [address] [place] [address] Street - [address]    [phone]    FAX: [phone]       Patient Care Team:  Esteban Laughlin MD as PCP - General (Internal Medicine Physician)  Esteban Laughlin MD as PCP - [address] [place] [address] Dr Brewster Provider      Problem List  Date Reviewed: 12/6/2022            Codes Class Noted    Type 2 diabetes mellitus ICD-10-CM: E11.9  ICD-9-CM: 250.00  7/26/2022        Hepatic steatosis ICD-10-CM: K76.0  ICD-9-CM: 571.8  5/26/2020        Obstructive sleep apnea hypopnea, mild ICD-10-CM: G47.33  ICD-9-CM: 327.23  2/26/2020        Family history of heart disease ICD-10-CM: Z82.49  ICD-9-CM: V17.49  10/5/2017        Allergic rhinitis ICD-10-CM: J30.9  ICD-9-CM: 477.9  5/1/2012         Jarrde Dumont is being seen at [address] for management of suspected NAFL. The active problem list, all pertinent past medical history, medications, radiologic findings and laboratory findings related to the liver disorder were reviewed and discussed with the patient. The patient is a 61 y.o.  male who was found to have elevated liver transaminases in 12/2018. Serologic evaluation for markers of chronic liver disease were negative. Ultrasound of the liver was performed in 1/2019. The results of the imaging suggested chronic liver disease. An assessment of liver fibrosis was performed by ultrasound elastography 8/2022.  The kPa score was 6.5 which is consistent with stage 0-1 fiborosis. The ultrasound was consistent with steatosis. Since the last office visit the patient was prescribed Ozempic. He has not yet started the medication. The patient does not have any symptoms which could be attributed to the liver disorder. The patient is not experiencing the following symptoms which are commonly seen in this liver disorder:   Fatigue or pain in the right side over the liver. The patient completes all daily activities without any functional limitations. ASSESSMENT AND PLAN:  Fatty liver  The diagnosis is based upon imaging, Fiboscan CAP score, features of metabolic syndrome, and   serologic studies that are negative for other causes of chronic liver disease. Ultrasound elastography performed 8/2022 demonstrated moderate steatosis and a kPa of 6.5 which is consistent with no significant fibrosis. Assessment of liver fibrosis was performed with Fibroscan in the office today. The result was 9.6 kPa which correlates with stage 2 septal fibrosis to Stage 3 bridging fibrosis. The CAP score of 344 suggests hepatic steatosis. Have performed laboratory testing to monitor liver function and degree of liver injury. This included BMP, hepatic panel, CBC with platelet count and INR. Laboratory testing from 7/26/2022 reviewed in detail. Follow-up testing ordered today. The liver transaminases are elevated. The ALP is normal. The liver function is normal. The platelet count is normal.     If the patient looses 20% of current body weight, which is 56 pounds, down to a weight of of 236 pounds, steatosis should resolve. Once all steatosis has resolved inflammation will resolve. Fibrosis will gradually resolve and the liver could eventually be normal.     Ozempic was recently prescribed. This class of medication is currently being studied in fatty liver disease. Advised he start the medication.      The Fibroscan can be repeated annually or as often as clinically indicated to assess for progression or regression of fibrosis. This will be done at the next office visit. Counseling for diet and weight loss in patients with confirmed or suspected NAFLD  The patient was counseled regarding diet and exercise to achieve weight loss. The best diet for patients with fatty liver is one very low in carbohydrates and enriched with protein. The patient was told not to consume any food products and drinks containing fructose as this enhances hepatic fat synthesis. Screening for Hepatocellular Carcinoma  HCC screening is not necessary if the patient has no evidence of cirrhosis. Treatment of other medical problems in patients with chronic liver disease  There are no contraindications for the patient to take most medications that are necessary for treatment of other medical issues. Counseling for alcohol in patients with chronic liver disease  The patient was counseled regarding alcohol consumption and the effect of alcohol on chronic liver disease. The patient does not consume any significant amount of alcohol. Vaccinations   Vaccination for viral hepatitis A and B is recommended since the patient has no serologic evidence of previous exposure or vaccination with immunity. The patient has received 2 doses of COVID-19 vaccine. Routine vaccinations against other bacterial and viral agents can be performed as indicated. Annual flu vaccination should be administered if indicated. ALLERGIES  No Known Allergies    MEDICATIONS  Current Outpatient Medications   Medication Sig    semaglutide (OZEMPIC) 0.25 mg or 0.5 mg/dose (2 mg/1.5 ml) subq pen 0.5 mg by SubCUTAneous route every seven (7) days for 60 days. ciclopirox (LOPROX) 0.77 % topical cream Apply  to affected area two (2) times a day for 90 days.     Farxiga 5 mg tab tablet TAKE 1 TABLET BY MOUTH EVERY DAY    metFORMIN (GLUCOPHAGE) 500 mg tablet TAKE 1 TABLET BY MOUTH TWO (2) TIMES DAILY (WITH MEALS) FOR 90 DAYS. fluticasone propionate (FLONASE) 50 mcg/actuation nasal spray 1 spray each nostril daily. No current facility-administered medications for this visit. SYSTEM REVIEW NOT RELATED TO LIVER DISEASE OR REVIEWED ABOVE:  Constitution systems: Negative for fever, chills, weight gain, weight loss. Eyes: Negative for visual changes. ENT: Negative for sore throat, painful swallowing. Respiratory: Negative for cough, hemoptysis, SOB. Cardiology: Negative for chest pain, palpitations. GI:  Negative for constipation or diarrhea. : Negative for urinary frequency, dysuria, hematuria, nocturia. Skin: Negative for rash. Hematology: Negative for easy bruising, blood clots. Musculo-skeletal: Negative for back pain, muscle pain, weakness. Neurologic: Negative for headaches, dizziness, vertigo, memory problems not related to HE. Psychology: Negative for anxiety, depression. FAMILY HISTORY:  The father Has/had the following following chronic disease(s): None. The mother Has/had the following chronic disease(s): None. There is no family history of liver disease. SOCIAL HISTORY:  The patient is . The patient has 2 children,   The patient has never used tobacco products. The patient consumes 2-4 alcoholic beverages per week. 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 PHYSICAL EXAMINATION:  Visit Vitals  /74 (BP 1 Location: Left upper arm, BP Patient Position: Sitting, BP Cuff Size: Adult)   Pulse 61   Temp 97.4 °F (36.3 °C) (Temporal)   Ht 6' (1.829 m)   Wt 282 lb (127.9 kg)   SpO2 98%   BMI 38.25 kg/m²       General: No acute distress. Eyes: Sclera anicteric. ENT: No oral lesions. Thyroid normal.  Nodes: No adenopathy. Skin: No spider angiomata. No jaundice. No palmar erythema. Respiratory: Lungs clear to auscultation. Cardiovascular: Regular heart rate. No murmurs. No JVD.   Abdomen: Soft non-tender, liver size normal to percussion/palpation. Spleen not palpable. No obvious ascites. Extremities: No edema. No muscle wasting. No gross arthritic changes. Neurologic: Alert and oriented. Cranial nerves grossly intact. No asterixis.'    LABORATORY STUDIES:  Liver [place] of [address] Units 7/26/2022 3/7/2022   WBC 4.1 - 11.1 K/uL 5.3    ANC 1.8 - 8.0 K/UL 2.1    HGB 12.1 - 17.0 g/dL 15.8     - 400 K/uL 194    INR 0.9 - 1.1   1.0    AST 15 - 37 U/L 43 (H) 51 (H)   ALT 12 - 78 U/L 88 (H) 102 (H)   Alk Phos 45 - 117 U/L 81 87   Bili, Total 0.2 - 1.0 MG/DL 0.7 0.7   Bili, Direct 0.0 - 0.2 MG/DL 0.2    Albumin 3.5 - 5.0 g/dL 4.1 4.1   BUN 6 - 20 MG/DL 13 12   Creat 0.70 - 1.30 MG/DL 0.86 0.87   Na 136 - 145 mmol/L 139 137   K 3.5 - 5.1 mmol/L 4.3 4.8   Cl 97 - 108 mmol/L 105 105   CO2 21 - 32 mmol/L 27 30   Glucose 65 - 100 mg/dL 96 97     Laboratory testing from 7/26/2022 reviewed in detail. Additional testing included to evaluate progression or regression of disease. Laboratory testing results from today will be communicated by My Chart. SEROLOGIES:  Serologies Latest Ref Rng & Units 7/26/2022   Hep A Ab, Total Negative   Positive (A)   Hep B Surface Ag Index <0.10   Hep B Surface Ag Interp Negative   Negative   Hep B Core Ab, Total Negative   Negative   Hep B Surface Ab mIU/mL <3.10   Hep B Surface Ab Interp NONREACTIVE   NONREACTIVE   Hep C Ab 0.0 - 0.9 s/co ratio <0.1   Ferritin 26 - 388 NG/   Iron % Saturation 20 - 50 % 31   SUSAN, IFA  Negative   ASMCA 0 - 19 Units 6   Ceruloplasmin 16.0 - 31.0 mg/dL 20.3   Alpha-1 antitrypsin level 101 - 187 mg/dL 144     LIVER HISTOLOGY:  8/2022. Ultrasound elastography score 6.5 which is consistent with stage 0-1 fibrosis. ENDOSCOPIC PROCEDURES:  Not available or performed    RADIOLOGY:  1/2019. Ultrasound of liver. Echogenic consistent with fatty liver. No liver mass lesions. No dilated bile ducts. No ascites. 8/2022.   Ultrasound of liver.  Echogenic consistent with fatty liver. No liver mass lesions. No dilated bile ducts. No ascites. OTHER TESTING:  Not available or performed    FOLLOW-UP:  All of the issues listed above in the Assessment and Plan were discussed with the patient. All questions were answered. The patient expressed a clear understanding of the above. [address] [address] in 6 months to assess for the effects of diet changes and weight loss along with Fibroscan. Marichuy Avila, [facility] of [address] [address] [facility] [address] [id number] Kirill Rodriguez, [address] [place] [address] 22.  [address] [facility]

## 2022-12-14 LAB
ALBUMIN SERPL-MCNC: 4.1 G/DL (ref 3.5–5)
ALBUMIN/GLOB SERPL: 1 {RATIO} (ref 1.1–2.2)
ALP SERPL-CCNC: 78 U/L (ref 45–117)
ALT SERPL-CCNC: 76 U/L (ref 12–78)
ANION GAP SERPL CALC-SCNC: 4 MMOL/L (ref 5–15)
AST SERPL-CCNC: 40 U/L (ref 15–37)
BASOPHILS # BLD: 0.1 K/UL (ref 0–0.1)
BASOPHILS NFR BLD: 1 % (ref 0–1)
BILIRUB DIRECT SERPL-MCNC: 0.2 MG/DL (ref 0–0.2)
BILIRUB SERPL-MCNC: 0.7 MG/DL (ref 0.2–1)
BUN SERPL-MCNC: 12 MG/DL (ref 6–20)
BUN/CREAT SERPL: 14 (ref 12–20)
CALCIUM SERPL-MCNC: 9.2 MG/DL (ref 8.5–10.1)
CHLORIDE SERPL-SCNC: 106 MMOL/L (ref 97–108)
CO2 SERPL-SCNC: 28 MMOL/L (ref 21–32)
CREAT SERPL-MCNC: 0.88 MG/DL (ref 0.7–1.3)
DIFFERENTIAL METHOD BLD: ABNORMAL
EOSINOPHIL # BLD: 0.3 K/UL (ref 0–0.4)
EOSINOPHIL NFR BLD: 4 % (ref 0–7)
ERYTHROCYTE [DISTWIDTH] IN BLOOD BY AUTOMATED COUNT: 12.1 % (ref 11.5–14.5)
GLOBULIN SER CALC-MCNC: 4.2 G/DL (ref 2–4)
GLUCOSE SERPL-MCNC: 83 MG/DL (ref 65–100)
HCT VFR BLD AUTO: 47.2 % (ref 36.6–50.3)
HGB BLD-MCNC: 15.6 G/DL (ref 12.1–17)
IMM GRANULOCYTES # BLD AUTO: 0 K/UL (ref 0–0.04)
IMM GRANULOCYTES NFR BLD AUTO: 0 % (ref 0–0.5)
LYMPHOCYTES # BLD: 2.9 K/UL (ref 0.8–3.5)
LYMPHOCYTES NFR BLD: 45 % (ref 12–49)
MCH RBC QN AUTO: 33.3 PG (ref 26–34)
MCHC RBC AUTO-ENTMCNC: 33.1 G/DL (ref 30–36.5)
MCV RBC AUTO: 100.6 FL (ref 80–99)
MONOCYTES # BLD: 0.7 K/UL (ref 0–1)
MONOCYTES NFR BLD: 11 % (ref 5–13)
NEUTS SEG # BLD: 2.5 K/UL (ref 1.8–8)
NEUTS SEG NFR BLD: 39 % (ref 32–75)
NRBC # BLD: 0 K/UL (ref 0–0.01)
NRBC BLD-RTO: 0 PER 100 WBC
PLATELET # BLD AUTO: 192 K/UL (ref 150–400)
PMV BLD AUTO: 10.9 FL (ref 8.9–12.9)
POTASSIUM SERPL-SCNC: 4.4 MMOL/L (ref 3.5–5.1)
PROT SERPL-MCNC: 8.3 G/DL (ref 6.4–8.2)
RBC # BLD AUTO: 4.69 M/UL (ref 4.1–5.7)
SODIUM SERPL-SCNC: 138 MMOL/L (ref 136–145)
WBC # BLD AUTO: 6.5 K/UL (ref 4.1–11.1)

## 2022-12-16 NOTE — PROGRESS NOTES
Letter sent regarding the blood work results which have improved slightly. Advised he work on dietary changes as discussed.

## 2022-12-27 DIAGNOSIS — E11.9 CONTROLLED TYPE 2 DIABETES MELLITUS WITHOUT COMPLICATION, WITHOUT LONG-TERM CURRENT USE OF INSULIN (HCC): ICD-10-CM

## 2022-12-27 RX ORDER — DAPAGLIFLOZIN 5 MG/1
TABLET, FILM COATED ORAL
Qty: 30 TABLET | Refills: 0 | Status: SHIPPED | OUTPATIENT
Start: 2022-12-27

## 2023-01-30 DIAGNOSIS — E11.9 CONTROLLED TYPE 2 DIABETES MELLITUS WITHOUT COMPLICATION, WITHOUT LONG-TERM CURRENT USE OF INSULIN (HCC): ICD-10-CM

## 2023-01-30 RX ORDER — DAPAGLIFLOZIN 5 MG/1
TABLET, FILM COATED ORAL
Qty: 30 TABLET | Refills: 0 | Status: SHIPPED | OUTPATIENT
Start: 2023-01-30

## 2023-02-15 ENCOUNTER — DOCUMENTATION ONLY (OUTPATIENT)
Dept: HEMATOLOGY | Age: 58
End: 2023-02-15

## 2023-02-17 ENCOUNTER — DOCUMENTATION ONLY (OUTPATIENT)
Dept: HEMATOLOGY | Age: 58
End: 2023-02-17

## 2023-02-21 DIAGNOSIS — E11.9 CONTROLLED TYPE 2 DIABETES MELLITUS WITHOUT COMPLICATION, WITHOUT LONG-TERM CURRENT USE OF INSULIN (HCC): ICD-10-CM

## 2023-02-21 RX ORDER — METFORMIN HYDROCHLORIDE 500 MG/1
500 TABLET ORAL 2 TIMES DAILY WITH MEALS
Qty: 180 TABLET | Refills: 0 | Status: SHIPPED | OUTPATIENT
Start: 2023-02-21 | End: 2023-05-22

## 2023-06-28 ENCOUNTER — OFFICE VISIT (OUTPATIENT)
Age: 58
End: 2023-06-28
Payer: COMMERCIAL

## 2023-06-28 VITALS
TEMPERATURE: 96.8 F | HEART RATE: 60 BPM | OXYGEN SATURATION: 98 % | HEIGHT: 73 IN | DIASTOLIC BLOOD PRESSURE: 79 MMHG | BODY MASS INDEX: 38.3 KG/M2 | SYSTOLIC BLOOD PRESSURE: 123 MMHG | WEIGHT: 289 LBS

## 2023-06-28 DIAGNOSIS — K76.0 NAFLD (NONALCOHOLIC FATTY LIVER DISEASE): Primary | ICD-10-CM

## 2023-06-28 DIAGNOSIS — E66.01 SEVERE OBESITY (BMI 35.0-39.9) WITH COMORBIDITY (HCC): ICD-10-CM

## 2023-06-28 DIAGNOSIS — E11.9 TYPE 2 DIABETES MELLITUS WITHOUT COMPLICATION, WITHOUT LONG-TERM CURRENT USE OF INSULIN (HCC): ICD-10-CM

## 2023-06-28 LAB
ALBUMIN SERPL-MCNC: 4 G/DL (ref 3.5–5)
ALBUMIN/GLOB SERPL: 1 (ref 1.1–2.2)
ALP SERPL-CCNC: 98 U/L (ref 45–117)
ALT SERPL-CCNC: 74 U/L (ref 12–78)
ANION GAP SERPL CALC-SCNC: 5 MMOL/L (ref 5–15)
AST SERPL-CCNC: 36 U/L (ref 15–37)
BASOPHILS # BLD: 0.1 K/UL (ref 0–0.1)
BASOPHILS NFR BLD: 1 % (ref 0–1)
BILIRUB DIRECT SERPL-MCNC: 0.2 MG/DL (ref 0–0.2)
BILIRUB SERPL-MCNC: 0.6 MG/DL (ref 0.2–1)
BUN SERPL-MCNC: 15 MG/DL (ref 6–20)
BUN/CREAT SERPL: 18 (ref 12–20)
CALCIUM SERPL-MCNC: 9.5 MG/DL (ref 8.5–10.1)
CHLORIDE SERPL-SCNC: 106 MMOL/L (ref 97–108)
CO2 SERPL-SCNC: 28 MMOL/L (ref 21–32)
CREAT SERPL-MCNC: 0.83 MG/DL (ref 0.7–1.3)
DIFFERENTIAL METHOD BLD: ABNORMAL
EOSINOPHIL # BLD: 0.2 K/UL (ref 0–0.4)
EOSINOPHIL NFR BLD: 4 % (ref 0–7)
ERYTHROCYTE [DISTWIDTH] IN BLOOD BY AUTOMATED COUNT: 11.9 % (ref 11.5–14.5)
GLOBULIN SER CALC-MCNC: 4.2 G/DL (ref 2–4)
GLUCOSE SERPL-MCNC: 123 MG/DL (ref 65–100)
HCT VFR BLD AUTO: 43.6 % (ref 36.6–50.3)
HGB BLD-MCNC: 14.3 G/DL (ref 12.1–17)
IMM GRANULOCYTES # BLD AUTO: 0 K/UL (ref 0–0.04)
IMM GRANULOCYTES NFR BLD AUTO: 0 % (ref 0–0.5)
LYMPHOCYTES # BLD: 2.5 K/UL (ref 0.8–3.5)
LYMPHOCYTES NFR BLD: 46 % (ref 12–49)
MCH RBC QN AUTO: 32.5 PG (ref 26–34)
MCHC RBC AUTO-ENTMCNC: 32.8 G/DL (ref 30–36.5)
MCV RBC AUTO: 99.1 FL (ref 80–99)
MONOCYTES # BLD: 0.6 K/UL (ref 0–1)
MONOCYTES NFR BLD: 11 % (ref 5–13)
NEUTS SEG # BLD: 2.1 K/UL (ref 1.8–8)
NEUTS SEG NFR BLD: 38 % (ref 32–75)
NRBC # BLD: 0 K/UL (ref 0–0.01)
NRBC BLD-RTO: 0 PER 100 WBC
PLATELET # BLD AUTO: 181 K/UL (ref 150–400)
PMV BLD AUTO: 11.3 FL (ref 8.9–12.9)
POTASSIUM SERPL-SCNC: 4.9 MMOL/L (ref 3.5–5.1)
PROT SERPL-MCNC: 8.2 G/DL (ref 6.4–8.2)
RBC # BLD AUTO: 4.4 M/UL (ref 4.1–5.7)
SODIUM SERPL-SCNC: 139 MMOL/L (ref 136–145)
WBC # BLD AUTO: 5.5 K/UL (ref 4.1–11.1)

## 2023-06-28 PROCEDURE — 99214 OFFICE O/P EST MOD 30 MIN: CPT | Performed by: NURSE PRACTITIONER

## 2023-06-28 PROCEDURE — 91200 LIVER ELASTOGRAPHY: CPT | Performed by: NURSE PRACTITIONER

## 2023-06-28 PROCEDURE — 3051F HG A1C>EQUAL 7.0%<8.0%: CPT | Performed by: NURSE PRACTITIONER

## 2023-06-28 RX ORDER — SEMAGLUTIDE 0.68 MG/ML
INJECTION, SOLUTION SUBCUTANEOUS
COMMUNITY
Start: 2023-04-18

## 2023-06-29 PROBLEM — K76.0 NAFLD (NONALCOHOLIC FATTY LIVER DISEASE): Status: ACTIVE | Noted: 2023-06-29

## 2023-08-29 ENCOUNTER — OFFICE VISIT (OUTPATIENT)
Dept: PRIMARY CARE CLINIC | Facility: CLINIC | Age: 58
End: 2023-08-29
Payer: COMMERCIAL

## 2023-08-29 VITALS
HEART RATE: 58 BPM | HEIGHT: 73 IN | DIASTOLIC BLOOD PRESSURE: 61 MMHG | SYSTOLIC BLOOD PRESSURE: 104 MMHG | OXYGEN SATURATION: 97 % | WEIGHT: 279.2 LBS | RESPIRATION RATE: 16 BRPM | TEMPERATURE: 97.1 F | BODY MASS INDEX: 37 KG/M2

## 2023-08-29 DIAGNOSIS — G47.33 OBSTRUCTIVE SLEEP APNEA HYPOPNEA, MILD: ICD-10-CM

## 2023-08-29 DIAGNOSIS — E11.9 TYPE 2 DIABETES MELLITUS WITHOUT COMPLICATION, WITHOUT LONG-TERM CURRENT USE OF INSULIN (HCC): Primary | ICD-10-CM

## 2023-08-29 DIAGNOSIS — Z11.4 ENCOUNTER FOR SCREENING FOR HIV: ICD-10-CM

## 2023-08-29 DIAGNOSIS — E11.9 TYPE 2 DIABETES MELLITUS WITHOUT COMPLICATION, WITHOUT LONG-TERM CURRENT USE OF INSULIN (HCC): ICD-10-CM

## 2023-08-29 DIAGNOSIS — K76.0 NAFLD (NONALCOHOLIC FATTY LIVER DISEASE): ICD-10-CM

## 2023-08-29 DIAGNOSIS — Z23 NEED FOR TETANUS BOOSTER: ICD-10-CM

## 2023-08-29 DIAGNOSIS — Z23 NEED FOR HEPATITIS B BOOSTER VACCINATION: ICD-10-CM

## 2023-08-29 LAB
ALBUMIN SERPL-MCNC: 3.8 G/DL (ref 3.5–5)
ALBUMIN/GLOB SERPL: 1 (ref 1.1–2.2)
ALP SERPL-CCNC: 85 U/L (ref 45–117)
ALT SERPL-CCNC: 66 U/L (ref 12–78)
ANION GAP SERPL CALC-SCNC: 4 MMOL/L (ref 5–15)
AST SERPL-CCNC: 37 U/L (ref 15–37)
BILIRUB SERPL-MCNC: 0.5 MG/DL (ref 0.2–1)
BUN SERPL-MCNC: 12 MG/DL (ref 6–20)
BUN/CREAT SERPL: 15 (ref 12–20)
CALCIUM SERPL-MCNC: 9 MG/DL (ref 8.5–10.1)
CHLORIDE SERPL-SCNC: 108 MMOL/L (ref 97–108)
CHOLEST SERPL-MCNC: 131 MG/DL
CO2 SERPL-SCNC: 28 MMOL/L (ref 21–32)
CREAT SERPL-MCNC: 0.79 MG/DL (ref 0.7–1.3)
CREAT UR-MCNC: 98.6 MG/DL
ERYTHROCYTE [DISTWIDTH] IN BLOOD BY AUTOMATED COUNT: 12.5 % (ref 11.5–14.5)
GLOBULIN SER CALC-MCNC: 3.9 G/DL (ref 2–4)
GLUCOSE SERPL-MCNC: 115 MG/DL (ref 65–100)
HCT VFR BLD AUTO: 43.9 % (ref 36.6–50.3)
HDLC SERPL-MCNC: 48 MG/DL
HDLC SERPL: 2.7 (ref 0–5)
HGB BLD-MCNC: 14.1 G/DL (ref 12.1–17)
HIV 1+2 AB+HIV1 P24 AG SERPL QL IA: NONREACTIVE
HIV 1/2 RESULT COMMENT: NORMAL
LDLC SERPL CALC-MCNC: 67 MG/DL (ref 0–100)
MCH RBC QN AUTO: 32.5 PG (ref 26–34)
MCHC RBC AUTO-ENTMCNC: 32.1 G/DL (ref 30–36.5)
MCV RBC AUTO: 101.2 FL (ref 80–99)
MICROALBUMIN UR-MCNC: 0.59 MG/DL
MICROALBUMIN/CREAT UR-RTO: 6 MG/G (ref 0–30)
NRBC # BLD: 0 K/UL (ref 0–0.01)
NRBC BLD-RTO: 0 PER 100 WBC
PLATELET # BLD AUTO: 182 K/UL (ref 150–400)
PMV BLD AUTO: 11.2 FL (ref 8.9–12.9)
POTASSIUM SERPL-SCNC: 4.4 MMOL/L (ref 3.5–5.1)
PROT SERPL-MCNC: 7.7 G/DL (ref 6.4–8.2)
RBC # BLD AUTO: 4.34 M/UL (ref 4.1–5.7)
SODIUM SERPL-SCNC: 140 MMOL/L (ref 136–145)
TRIGL SERPL-MCNC: 80 MG/DL
VLDLC SERPL CALC-MCNC: 16 MG/DL
WBC # BLD AUTO: 4.7 K/UL (ref 4.1–11.1)

## 2023-08-29 PROCEDURE — 99214 OFFICE O/P EST MOD 30 MIN: CPT | Performed by: INTERNAL MEDICINE

## 2023-08-29 PROCEDURE — 3051F HG A1C>EQUAL 7.0%<8.0%: CPT | Performed by: INTERNAL MEDICINE

## 2023-08-29 PROCEDURE — 90471 IMMUNIZATION ADMIN: CPT | Performed by: INTERNAL MEDICINE

## 2023-08-29 PROCEDURE — 90739 HEPB VACC 2/4 DOSE ADULT IM: CPT | Performed by: INTERNAL MEDICINE

## 2023-08-29 SDOH — ECONOMIC STABILITY: HOUSING INSECURITY
IN THE LAST 12 MONTHS, WAS THERE A TIME WHEN YOU DID NOT HAVE A STEADY PLACE TO SLEEP OR SLEPT IN A SHELTER (INCLUDING NOW)?: PATIENT REFUSED

## 2023-08-29 SDOH — ECONOMIC STABILITY: FOOD INSECURITY: WITHIN THE PAST 12 MONTHS, YOU WORRIED THAT YOUR FOOD WOULD RUN OUT BEFORE YOU GOT MONEY TO BUY MORE.: PATIENT DECLINED

## 2023-08-29 SDOH — ECONOMIC STABILITY: INCOME INSECURITY: HOW HARD IS IT FOR YOU TO PAY FOR THE VERY BASICS LIKE FOOD, HOUSING, MEDICAL CARE, AND HEATING?: PATIENT DECLINED

## 2023-08-29 SDOH — ECONOMIC STABILITY: FOOD INSECURITY: WITHIN THE PAST 12 MONTHS, THE FOOD YOU BOUGHT JUST DIDN'T LAST AND YOU DIDN'T HAVE MONEY TO GET MORE.: PATIENT DECLINED

## 2023-08-29 ASSESSMENT — ENCOUNTER SYMPTOMS
ABDOMINAL PAIN: 0
COLOR CHANGE: 0
CONSTIPATION: 0
DIARRHEA: 0
RHINORRHEA: 0
SHORTNESS OF BREATH: 0
CHEST TIGHTNESS: 0
EYE DISCHARGE: 0
BACK PAIN: 0
SORE THROAT: 0
COUGH: 0

## 2023-08-29 ASSESSMENT — PATIENT HEALTH QUESTIONNAIRE - PHQ9
2. FEELING DOWN, DEPRESSED OR HOPELESS: 0
SUM OF ALL RESPONSES TO PHQ QUESTIONS 1-9: 0
SUM OF ALL RESPONSES TO PHQ QUESTIONS 1-9: 0
1. LITTLE INTEREST OR PLEASURE IN DOING THINGS: 0
SUM OF ALL RESPONSES TO PHQ9 QUESTIONS 1 & 2: 0
SUM OF ALL RESPONSES TO PHQ QUESTIONS 1-9: 0
SUM OF ALL RESPONSES TO PHQ QUESTIONS 1-9: 0

## 2023-08-29 NOTE — PROGRESS NOTES
Horacio Carter (: 1965) is a 61 y.o. male, established patient, here for evaluation of the following chief complaint(s):  Follow-up (Diabetes and labs )    ASSESSMENT/PLAN:  Below is the assessment and plan developed based on review of pertinent history, physical exam, labs, studies, and medications. 1. Type 2 diabetes mellitus without complication, without long-term current use of insulin (HCC)  -     CBC; Future  -     Lipid Panel; Future  -     Hemoglobin A1C; Future  -     Microalbumin / Creatinine Urine Ratio; Future  I ordered a CBC, a lipid panel, blood work to check his hemoglobin A1C, and a urinalysis to check his microalbumin/creatinine urine ratio. I recommend that he continues taking injecting Ozempic 0.5 mg weekly and taking metformin 500 mg BID with meals. 2. NAFLD (nonalcoholic fatty liver disease)  -     Comprehensive Metabolic Panel; Future  He is followed by Breann Chen NP (Hepatology). I ordered a CMP. I advised him to maintain his weight loss through exercise as he has been doing. 3. Obstructive sleep apnea hypopnea, mild  Since he is feeling well rested when he wakes up, he does not feel the need to have another sleep study done. 4. Encounter for screening for HIV  -     HIV 1/2 Ag/Ab, 4TH Generation,W Rflx Confirm; Future  I ordered an HIV screening test.    5. Need for hepatitis B booster vaccination  -     Hep B, HEPLISAV-B, (age 25 yrs+), IM, 0.5mL, 2-Dose  I administered a hepatitis B booster vaccination in the office today. 6. Need for tetanus booster  -     Tdap (ADACEL) 5-2-15.5 LF-MCG/0.5 injection; Inject 0.5 mLs into the muscle once for 1 dose, Disp-0.5 mL, R-0Normal sent to pharmacy. I ordered a Tdap vaccine. SUBJECTIVE/OBJECTIVE:  HPI  The patient presents today for follow up on chronic conditions. He is fasting today. He has lost 10 lbs since 23. He has been increasing his activity level by walking and playing tennis twice a week.  He is

## 2023-08-30 LAB
EST. AVERAGE GLUCOSE BLD GHB EST-MCNC: 131 MG/DL
HBA1C MFR BLD: 6.2 % (ref 4–5.6)

## 2023-10-14 DIAGNOSIS — E11.69 TYPE 2 DIABETES MELLITUS WITH OTHER SPECIFIED COMPLICATION, WITHOUT LONG-TERM CURRENT USE OF INSULIN (HCC): ICD-10-CM

## 2023-10-14 RX ORDER — SEMAGLUTIDE 0.68 MG/ML
INJECTION, SOLUTION SUBCUTANEOUS
Qty: 3 ML | Refills: 1 | Status: SHIPPED | OUTPATIENT
Start: 2023-10-14

## 2023-11-16 DIAGNOSIS — E11.69 TYPE 2 DIABETES MELLITUS WITH OTHER SPECIFIED COMPLICATION, WITHOUT LONG-TERM CURRENT USE OF INSULIN (HCC): ICD-10-CM

## 2023-11-16 DIAGNOSIS — E11.9 TYPE 2 DIABETES MELLITUS WITHOUT COMPLICATION, WITHOUT LONG-TERM CURRENT USE OF INSULIN (HCC): Primary | ICD-10-CM

## 2023-11-16 RX ORDER — SEMAGLUTIDE 0.68 MG/ML
INJECTION, SOLUTION SUBCUTANEOUS
Qty: 3 ML | Refills: 1 | OUTPATIENT
Start: 2023-11-16

## 2023-12-01 NOTE — TELEPHONE ENCOUNTER
Patient is calling asking about getting his Metformin Refilled.      LOV 8/29/23  Last refill 2/21/23

## 2024-01-10 ENCOUNTER — OFFICE VISIT (OUTPATIENT)
Age: 59
End: 2024-01-10
Payer: COMMERCIAL

## 2024-01-10 VITALS
BODY MASS INDEX: 36.37 KG/M2 | OXYGEN SATURATION: 97 % | HEART RATE: 65 BPM | DIASTOLIC BLOOD PRESSURE: 74 MMHG | HEIGHT: 73 IN | SYSTOLIC BLOOD PRESSURE: 118 MMHG | TEMPERATURE: 98.1 F | WEIGHT: 274.4 LBS

## 2024-01-10 DIAGNOSIS — E11.9 TYPE 2 DIABETES MELLITUS WITHOUT COMPLICATION, WITHOUT LONG-TERM CURRENT USE OF INSULIN (HCC): ICD-10-CM

## 2024-01-10 DIAGNOSIS — K76.0 NAFLD (NONALCOHOLIC FATTY LIVER DISEASE): Primary | ICD-10-CM

## 2024-01-10 LAB
ALBUMIN SERPL-MCNC: 3.9 G/DL (ref 3.5–5)
ALBUMIN/GLOB SERPL: 1 (ref 1.1–2.2)
ALP SERPL-CCNC: 81 U/L (ref 45–117)
ALT SERPL-CCNC: 57 U/L (ref 12–78)
ANION GAP SERPL CALC-SCNC: 0 MMOL/L (ref 5–15)
AST SERPL-CCNC: 25 U/L (ref 15–37)
BASOPHILS # BLD: 0.1 K/UL (ref 0–0.1)
BASOPHILS NFR BLD: 1 % (ref 0–1)
BILIRUB DIRECT SERPL-MCNC: 0.2 MG/DL (ref 0–0.2)
BILIRUB SERPL-MCNC: 0.6 MG/DL (ref 0.2–1)
BUN SERPL-MCNC: 14 MG/DL (ref 6–20)
BUN/CREAT SERPL: 16 (ref 12–20)
CALCIUM SERPL-MCNC: 8.8 MG/DL (ref 8.5–10.1)
CHLORIDE SERPL-SCNC: 107 MMOL/L (ref 97–108)
CO2 SERPL-SCNC: 31 MMOL/L (ref 21–32)
CREAT SERPL-MCNC: 0.86 MG/DL (ref 0.7–1.3)
DIFFERENTIAL METHOD BLD: ABNORMAL
EOSINOPHIL # BLD: 0.3 K/UL (ref 0–0.4)
EOSINOPHIL NFR BLD: 7 % (ref 0–7)
ERYTHROCYTE [DISTWIDTH] IN BLOOD BY AUTOMATED COUNT: 12.5 % (ref 11.5–14.5)
GLOBULIN SER CALC-MCNC: 4 G/DL (ref 2–4)
GLUCOSE SERPL-MCNC: 102 MG/DL (ref 65–100)
HCT VFR BLD AUTO: 44.6 % (ref 36.6–50.3)
HGB BLD-MCNC: 14.8 G/DL (ref 12.1–17)
IMM GRANULOCYTES # BLD AUTO: 0 K/UL (ref 0–0.04)
IMM GRANULOCYTES NFR BLD AUTO: 0 % (ref 0–0.5)
LYMPHOCYTES # BLD: 2.3 K/UL (ref 0.8–3.5)
LYMPHOCYTES NFR BLD: 44 % (ref 12–49)
MCH RBC QN AUTO: 33 PG (ref 26–34)
MCHC RBC AUTO-ENTMCNC: 33.2 G/DL (ref 30–36.5)
MCV RBC AUTO: 99.6 FL (ref 80–99)
MONOCYTES # BLD: 0.5 K/UL (ref 0–1)
MONOCYTES NFR BLD: 9 % (ref 5–13)
NEUTS SEG # BLD: 2 K/UL (ref 1.8–8)
NEUTS SEG NFR BLD: 39 % (ref 32–75)
NRBC # BLD: 0 K/UL (ref 0–0.01)
NRBC BLD-RTO: 0 PER 100 WBC
PLATELET # BLD AUTO: 191 K/UL (ref 150–400)
PMV BLD AUTO: 11 FL (ref 8.9–12.9)
POTASSIUM SERPL-SCNC: 5 MMOL/L (ref 3.5–5.1)
PROT SERPL-MCNC: 7.9 G/DL (ref 6.4–8.2)
RBC # BLD AUTO: 4.48 M/UL (ref 4.1–5.7)
SODIUM SERPL-SCNC: 138 MMOL/L (ref 136–145)
WBC # BLD AUTO: 5.2 K/UL (ref 4.1–11.1)

## 2024-01-10 PROCEDURE — 99214 OFFICE O/P EST MOD 30 MIN: CPT | Performed by: NURSE PRACTITIONER

## 2024-01-10 PROCEDURE — 91200 LIVER ELASTOGRAPHY: CPT | Performed by: NURSE PRACTITIONER

## 2024-01-10 ASSESSMENT — PATIENT HEALTH QUESTIONNAIRE - PHQ9
SUM OF ALL RESPONSES TO PHQ QUESTIONS 1-9: 0
SUM OF ALL RESPONSES TO PHQ QUESTIONS 1-9: 0
1. LITTLE INTEREST OR PLEASURE IN DOING THINGS: 0
SUM OF ALL RESPONSES TO PHQ9 QUESTIONS 1 & 2: 0
SUM OF ALL RESPONSES TO PHQ QUESTIONS 1-9: 0
SUM OF ALL RESPONSES TO PHQ QUESTIONS 1-9: 0
2. FEELING DOWN, DEPRESSED OR HOPELESS: 0

## 2024-01-10 NOTE — PROGRESS NOTES
Identified pt with two pt identifiers(name and ). Reviewed record in preparation for visit and have obtained necessary documentation.    Chief Complaint   Patient presents with    Other     FS     /74 (Site: Left Upper Arm, Position: Sitting, Cuff Size: Large Adult)   Pulse 65   Temp 98.1 °F (36.7 °C)   Ht 1.854 m (6' 1\")   Wt 124.5 kg (274 lb 6.4 oz)   SpO2 97%   BMI 36.20 kg/m²       1. \"Have you been to the ER, urgent care clinic since your last visit?  Hospitalized since your last visit?\" No    2. \"Have you seen or consulted any other health care providers outside of the [facility] since your last visit?\" Yes PCP      Patient is accompanied by self I have received verbal consent from Horacio Carter to discuss any/all medical information while they are present in the room.        
[facility]. EkPa was 7.1.  IQR/med 9%.  .   The results suggested a fibrosis level of F1. The CAP score suggests there is hepatic steatosis.      ENDOSCOPIC PROCEDURES:  Not available or performed    RADIOLOGY:  1/2019.  Ultrasound of liver.  Echogenic consistent with fatty liver.  No liver mass lesions.  No dilated bile ducts.  No ascites.  8/2022.  Ultrasound of liver.  Echogenic consistent with fatty liver.  No liver mass lesions.  No dilated bile ducts.  No ascites.    OTHER TESTING:  Not available or performed    FOLLOW-UP:  All of the issues listed above in the Assessment and Plan were discussed with the patient.  All questions were answered.  The patient expressed a clear understanding of the above.    Follow-up [facility] in 6 months for ongoing monitoring and treatment.       AURELIA Parada-BC  [address], [address]  [place]  [address] [phone]  [facility]

## 2024-01-17 NOTE — PROGRESS NOTES
Jarred Medel (: 1965) is a 63 y.o. male, established patient, here for evaluation of the following chief complaint(s):  Diabetes Care Management  (labs)   Written by Wilbur Mclaughlin, as dictated by Dr. Jojo Mercedes MD.      ASSESSMENT/PLAN:  Below is the assessment and plan developed based on review of pertinent history, physical exam, labs, studies, and medications. 1. Controlled type 2 diabetes mellitus without complication, without long-term current use of insulin ([facility]  Ordered lab work to check kidney function, metabolic panel, CBC levels, A1c, and lipid panel. Waiting for results. Continue taking metformin 850 mg as prescribed. Prescribed Ozempic 0.25 mg/0.2 mL. Take medication as prescribed. Potential side effects were discussed. -      DIABETES FOOT EXAM  -     MICROALBUMIN, UR, RAND W/ MICROALB/CREAT RATIO; Future  -     METABOLIC PANEL, COMPREHENSIVE; Future  -     CBC W/O DIFF; Future  -     HEMOGLOBIN A1C WITH EAG; Future  -     LIPID PANEL; Future  -     semaglutide (Ozempic) 0.25 mg/0.2 mL (2 mg/1.5 mL) sub-q pen; 0.25 mg by SubCUTAneous route every seven (7) days for 30 days. , Normal, Disp-1 Box, R-0 sent to pharmacy. 2. Dysthymia  Heart murmur heard during examination. I referred him to Cardiology to evaluate. 3. Severe obesity (BMI 35.0-35.9 with comorbidity) ([facility]  I gave him the number for our weight loss clinic so that he can attend a free orientation. Advised pt to purchase a FitBit or similar device. Discussed that a healthy lifestyle requires 5,000-7,000 steps daily, but weight loss requires 10,000 steps daily. 4. MIRIAM (obstructive sleep apnea)  Referred to sleep medicine for a sleep study and CPAP fitting.  -     SLEEP MEDICINE REFERRAL    5. Onychomycosis  Prescribed Loprox 0.77% topical cream. Apply as prescribed. Potential side effects were discussed. Advised to keep toenails short.   -     ciclopirox (LOPROX) 0.77 % topical cream; Apply  to affected area two (2) times a day., Normal, Disp-15 g, R-0 Potential side effects were discussed. 6. Dyspnea on exertion   I referred him to Cardiology to evaluate shortness of breath.  -     REFERRAL TO CARDIOLOGY    SUBJECTIVE/OBJECTIVE:  HPI  Patient presents today for a routine visit. He is fasting for lab work. His weight changed from 294 lb on 5/26/20 to 292 today. He does moderate physical activity from playing tennis, but he is not watching his diet. He travels for work, so he eats fast food frequently. He denies any heart palpitations, but has SOB going up the stairs. He takes Cymbalta for depression which is working well. He does not use a CPAP since it got recalled. He sleeps at night. He is followed by [facility] for annual eye exams. Patient Active Problem List   Diagnosis Code    Allergic rhinitis J30.9    Family history of heart disease Z82.49    Obstructive sleep apnea hypopnea, mild G47.33    Hepatic steatosis K76.0        Current Outpatient Medications on File Prior to Visit   Medication Sig Dispense Refill    metFORMIN (GLUCOPHAGE) 850 mg tablet TAKE 1 TABLET BY MOUTH TWICE A DAY WITH MEALS 180 Tab 0    fluticasone propionate (FLONASE) 50 mcg/actuation nasal spray 1 spray each nostril daily. 1 Bottle 0    DULoxetine (CYMBALTA) 60 mg capsule Take 60 mg by mouth daily. No current facility-administered medications on file prior to visit. 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  secondary to hearing loss       Family History   Problem Relation Age of Onset    Bipolar Disorder Brother        Social History     Socioeconomic History    Marital status:      Spouse name: Not on file  Number of children: Not on file    Years of education: Not on file    Highest education level: Not on file   Occupational History    Not on file   Tobacco Use    Smoking status: Never Smoker    Smokeless tobacco: Never Used   Substance and Sexual Activity    Alcohol use: Yes     Alcohol/week: 1.7 standard drinks     Types: 2 Standard drinks or equivalent per week    Drug use: No    Sexual activity: Yes     Partners: Female   Other Topics Concern    Not on file   Social History Narrative    Not on file     Social Determinants of Health     Financial Resource Strain:     Difficulty of Paying Living Expenses:    Food Insecurity:     Worried About Running Out of Food in the Last Year:     [address] in the Last Year:    Transportation Needs:     Lack of Transportation (Medical):  Lack of Transportation (Non-Medical):    Physical Activity:     Days of Exercise per Week:     Minutes of Exercise per Session:    Stress:     Feeling of Stress :    Social Connections:     Frequency of Communication with Friends and Family:     Frequency of Social Gatherings with Friends and Family:     Attends Baptism Services:     Active Member of Clubs or Organizations:     Attends Club or Organization Meetings:     Marital Status:    Intimate Partner Violence:     Fear of Current or Ex-Partner:     Emotionally Abused:     Physically Abused:     Sexually Abused:        No visits with results within 3 Month(s) from this visit. Latest known visit with results is:   Office Visit on 05/26/2020   Component Date Value Ref Range Status    Hemoglobin A1c (POC) 05/26/2020 6.9  % Final    ALBUMIN, URINE POC 05/26/2020 10  Negative mg/L Final    CREATININE, URINE POC 05/26/2020 50  mg/dL Final    Microalbumin/creat ratio (POC) 05/26/2020 <30  <30 MG/G Final      Review of Systems   Constitutional: Negative for activity change, fatigue and unexpected weight change.    HENT: Negative for congestion, ear discharge, ear pain, hearing loss, rhinorrhea and sore throat. Eyes: Negative for pain, discharge and redness. Respiratory: Positive for shortness of breath. Negative for cough and chest tightness. Cardiovascular: Negative for leg swelling. Gastrointestinal: Negative for abdominal pain, constipation and diarrhea. Endocrine: Negative for polyuria. Genitourinary: Negative for dysuria, flank pain and urgency. Musculoskeletal: Negative for arthralgias, back pain and myalgias. Skin: Negative for color change. Neurological: Negative for dizziness, light-headedness and headaches. Psychiatric/Behavioral: Negative for dysphoric mood and sleep disturbance. The patient is not nervous/anxious. Visit Vitals  /77 (BP 1 Location: Right arm, BP Patient Position: Sitting)   Pulse 60   Temp 98.4 °F (36.9 °C) (Temporal)   Resp 18   Ht 6' (1.829 m)   Wt 292 lb (132.5 kg)   SpO2 97%   BMI 39.60 kg/m²      Physical Exam  Vitals and nursing note reviewed. Constitutional:       General: He is not in acute distress. Appearance: Normal appearance. He is well-developed. He is not diaphoretic. HENT:      Head: Normocephalic and atraumatic. Right Ear: External ear normal.      Left Ear: External ear normal.      Mouth/Throat:      Mouth: Mucous membranes are moist.      Pharynx: Oropharynx is clear. Eyes:      General: No scleral icterus. Right eye: No discharge. Left eye: No discharge. Extraocular Movements: Extraocular movements intact. Conjunctiva/sclera: Conjunctivae normal.      Pupils: Pupils are equal, round, and reactive to light. Cardiovascular:      Rate and Rhythm: Normal rate and regular rhythm. Pulses: Normal pulses. Heart sounds: Normal heart sounds. Pulmonary:      Effort: Pulmonary effort is normal.      Breath sounds: Normal breath sounds. No wheezing. Musculoskeletal:      Right lower leg: No edema. Left lower leg: No edema.    Feet: Comments: Diabetic foot exam:     Left: Vibratory sensation normal    Sharp/dull discrimination normal    Filament test normal sensation with micro filament   Pulse DP: 2+ (normal)   Deformities: Yes - yellow discoloration of BL big toes and callous. Right: Vibratory sensation normal   Sharp/dull discrimination normal   Filament test normal sensation with micro filament   Pulse DP: 2+ (normal)   Deformities: Yes - yellow discoloration of BL big toes and callous. Neurological:      Mental Status: He is alert and oriented to person, place, and time. Psychiatric:         Mood and Affect: Mood and affect normal.            An electronic signature was used to authenticate this note.   -- Marie Fernández Yes

## 2024-02-15 DIAGNOSIS — E11.9 TYPE 2 DIABETES MELLITUS WITHOUT COMPLICATION, WITHOUT LONG-TERM CURRENT USE OF INSULIN (HCC): ICD-10-CM

## 2024-02-15 RX ORDER — SEMAGLUTIDE 1.34 MG/ML
1 INJECTION, SOLUTION SUBCUTANEOUS
Qty: 3 ML | Refills: 1 | Status: SHIPPED | OUTPATIENT
Start: 2024-02-15

## 2024-04-14 DIAGNOSIS — E11.9 TYPE 2 DIABETES MELLITUS WITHOUT COMPLICATION, WITHOUT LONG-TERM CURRENT USE OF INSULIN (HCC): ICD-10-CM

## 2024-04-15 RX ORDER — SEMAGLUTIDE 1.34 MG/ML
1 INJECTION, SOLUTION SUBCUTANEOUS
Qty: 3 ML | Refills: 1 | Status: SHIPPED | OUTPATIENT
Start: 2024-04-15

## 2024-05-03 ENCOUNTER — OFFICE VISIT (OUTPATIENT)
Dept: PRIMARY CARE CLINIC | Facility: CLINIC | Age: 59
End: 2024-05-03
Payer: COMMERCIAL

## 2024-05-03 VITALS
HEART RATE: 66 BPM | TEMPERATURE: 97.1 F | HEIGHT: 73 IN | DIASTOLIC BLOOD PRESSURE: 76 MMHG | OXYGEN SATURATION: 98 % | RESPIRATION RATE: 20 BRPM | WEIGHT: 279 LBS | BODY MASS INDEX: 36.98 KG/M2 | SYSTOLIC BLOOD PRESSURE: 129 MMHG

## 2024-05-03 DIAGNOSIS — E66.01 SEVERE OBESITY (BMI 35.0-39.9) WITH COMORBIDITY (HCC): ICD-10-CM

## 2024-05-03 DIAGNOSIS — B35.1 ONYCHOMYCOSIS OF GREAT TOE: ICD-10-CM

## 2024-05-03 DIAGNOSIS — Z11.59 NEED FOR HEPATITIS B SCREENING TEST: ICD-10-CM

## 2024-05-03 DIAGNOSIS — E11.9 WELL CONTROLLED DIABETES MELLITUS (HCC): ICD-10-CM

## 2024-05-03 DIAGNOSIS — Z23 NEED FOR TETANUS BOOSTER: ICD-10-CM

## 2024-05-03 DIAGNOSIS — R06.83 SNORES: ICD-10-CM

## 2024-05-03 DIAGNOSIS — E11.9 WELL CONTROLLED DIABETES MELLITUS (HCC): Primary | ICD-10-CM

## 2024-05-03 DIAGNOSIS — K76.0 NAFLD (NONALCOHOLIC FATTY LIVER DISEASE): ICD-10-CM

## 2024-05-03 PROCEDURE — 99214 OFFICE O/P EST MOD 30 MIN: CPT | Performed by: INTERNAL MEDICINE

## 2024-05-03 RX ORDER — CICLOPIROX 80 MG/ML
SOLUTION TOPICAL
Qty: 6 ML | Refills: 1 | Status: SHIPPED | OUTPATIENT
Start: 2024-05-03

## 2024-05-03 ASSESSMENT — ENCOUNTER SYMPTOMS
EYE DISCHARGE: 0
CONSTIPATION: 0
RHINORRHEA: 0
DIARRHEA: 0
COLOR CHANGE: 0
BACK PAIN: 0
CHEST TIGHTNESS: 0
COUGH: 0
SHORTNESS OF BREATH: 0
ABDOMINAL PAIN: 0
SORE THROAT: 0

## 2024-05-03 ASSESSMENT — PATIENT HEALTH QUESTIONNAIRE - PHQ9
SUM OF ALL RESPONSES TO PHQ QUESTIONS 1-9: 0
2. FEELING DOWN, DEPRESSED OR HOPELESS: NOT AT ALL
SUM OF ALL RESPONSES TO PHQ QUESTIONS 1-9: 0
1. LITTLE INTEREST OR PLEASURE IN DOING THINGS: NOT AT ALL
SUM OF ALL RESPONSES TO PHQ9 QUESTIONS 1 & 2: 0

## 2024-05-03 NOTE — PROGRESS NOTES
\"Have you been to the ER, urgent care clinic since your last visit?  Hospitalized since your last visit?\"    NO      “Have you seen or consulted any other health care providers outside of [facility] since your last visit?”    NO      Chief Complaint   Patient presents with    Follow-up     On diabetes        Pt is ok with scribe.   
 0.2 - 1.0 MG/DL Final    Bilirubin, Direct 01/10/2024 0.2  0.0 - 0.2 MG/DL Final    Alk Phosphatase 01/10/2024 81  45 - 117 U/L Final    AST 01/10/2024 25  15 - 37 U/L Final    ALT 01/10/2024 57  12 - 78 U/L Final         Review of Systems   Constitutional:  Negative for activity change, fatigue and unexpected weight change.   HENT:  Negative for congestion, hearing loss, rhinorrhea and sore throat.    Eyes:  Negative for discharge.   Respiratory:  Negative for cough, chest tightness and shortness of breath.    Cardiovascular:  Negative for leg swelling.   Gastrointestinal:  Negative for abdominal pain, constipation and diarrhea.   Genitourinary:  Negative for dysuria, flank pain, frequency and urgency.   Musculoskeletal:  Negative for arthralgias, back pain and myalgias.   Skin:  Negative for color change and rash.   Neurological:  Negative for dizziness, light-headedness and headaches.   Psychiatric/Behavioral:  Negative for dysphoric mood and sleep disturbance. The patient is not nervous/anxious.           /76 (Site: Left Upper Arm, Position: Sitting)   Pulse 66   Temp 97.1 °F (36.2 °C) (Temporal)   Resp 20   Ht 1.854 m (6' 1\")   Wt 126.6 kg (279 lb)   SpO2 98%   BMI 36.81 kg/m²     Physical Exam  Vitals and nursing note reviewed.   Constitutional:       General: He is not in acute distress.     Appearance: Normal appearance. He is obese. He is not diaphoretic.   HENT:      Right Ear: External ear normal.      Left Ear: External ear normal.   Eyes:      General: No scleral icterus.        Right eye: No discharge.         Left eye: No discharge.      Extraocular Movements: Extraocular movements intact.      Conjunctiva/sclera: Conjunctivae normal.   Cardiovascular:      Rate and Rhythm: Normal rate and regular rhythm.   Pulmonary:      Effort: Pulmonary effort is normal.      Breath sounds: Normal breath sounds. No wheezing.   Abdominal:      General: Bowel sounds are normal.      Palpations: Abdomen

## 2024-05-04 LAB
ALBUMIN SERPL-MCNC: 4 G/DL (ref 3.5–5)
ALBUMIN/GLOB SERPL: 1 (ref 1.1–2.2)
ALP SERPL-CCNC: 79 U/L (ref 45–117)
ALT SERPL-CCNC: 69 U/L (ref 12–78)
ANION GAP SERPL CALC-SCNC: 5 MMOL/L (ref 5–15)
AST SERPL-CCNC: 31 U/L (ref 15–37)
BILIRUB SERPL-MCNC: 0.6 MG/DL (ref 0.2–1)
BUN SERPL-MCNC: 13 MG/DL (ref 6–20)
BUN/CREAT SERPL: 14 (ref 12–20)
CALCIUM SERPL-MCNC: 9.5 MG/DL (ref 8.5–10.1)
CHLORIDE SERPL-SCNC: 109 MMOL/L (ref 97–108)
CO2 SERPL-SCNC: 28 MMOL/L (ref 21–32)
CREAT SERPL-MCNC: 0.91 MG/DL (ref 0.7–1.3)
EST. AVERAGE GLUCOSE BLD GHB EST-MCNC: 123 MG/DL
GLOBULIN SER CALC-MCNC: 3.9 G/DL (ref 2–4)
GLUCOSE SERPL-MCNC: 70 MG/DL (ref 65–100)
HBA1C MFR BLD: 5.9 % (ref 4–5.6)
HBV SURFACE AB SER QL: REACTIVE
HBV SURFACE AB SER-ACNC: 65.42 MIU/ML
POTASSIUM SERPL-SCNC: 4.2 MMOL/L (ref 3.5–5.1)
PROT SERPL-MCNC: 7.9 G/DL (ref 6.4–8.2)
SODIUM SERPL-SCNC: 142 MMOL/L (ref 136–145)

## 2024-05-17 ENCOUNTER — TELEPHONE (OUTPATIENT)
Age: 59
End: 2024-05-17

## 2024-05-30 ENCOUNTER — TELEPHONE (OUTPATIENT)
Age: 59
End: 2024-05-30

## 2024-06-04 DIAGNOSIS — E11.9 WELL CONTROLLED DIABETES MELLITUS (HCC): Primary | ICD-10-CM

## 2024-07-13 DIAGNOSIS — E11.9 TYPE 2 DIABETES MELLITUS WITHOUT COMPLICATION, WITHOUT LONG-TERM CURRENT USE OF INSULIN (HCC): ICD-10-CM

## 2024-07-14 DIAGNOSIS — E11.9 TYPE 2 DIABETES MELLITUS WITHOUT COMPLICATION, WITHOUT LONG-TERM CURRENT USE OF INSULIN (HCC): ICD-10-CM

## 2024-07-14 RX ORDER — SEMAGLUTIDE 1.34 MG/ML
1 INJECTION, SOLUTION SUBCUTANEOUS
Qty: 3 ML | Refills: 1 | Status: SHIPPED | OUTPATIENT
Start: 2024-07-14

## 2024-07-15 RX ORDER — SEMAGLUTIDE 1.34 MG/ML
1 INJECTION, SOLUTION SUBCUTANEOUS
Qty: 3 ML | Refills: 1 | OUTPATIENT
Start: 2024-07-15

## 2024-08-30 DIAGNOSIS — E11.9 WELL CONTROLLED DIABETES MELLITUS (HCC): ICD-10-CM

## 2024-09-11 DIAGNOSIS — E11.9 TYPE 2 DIABETES MELLITUS WITHOUT COMPLICATION, WITHOUT LONG-TERM CURRENT USE OF INSULIN (HCC): ICD-10-CM

## 2024-09-11 RX ORDER — SEMAGLUTIDE 1.34 MG/ML
1 INJECTION, SOLUTION SUBCUTANEOUS
Qty: 3 ML | Refills: 1 | Status: SHIPPED | OUTPATIENT
Start: 2024-09-11

## 2024-09-18 ENCOUNTER — OFFICE VISIT (OUTPATIENT)
Age: 59
End: 2024-09-18
Payer: COMMERCIAL

## 2024-09-18 VITALS
WEIGHT: 280.2 LBS | HEART RATE: 66 BPM | BODY MASS INDEX: 37.14 KG/M2 | HEIGHT: 73 IN | OXYGEN SATURATION: 97 % | SYSTOLIC BLOOD PRESSURE: 114 MMHG | TEMPERATURE: 97.6 F | DIASTOLIC BLOOD PRESSURE: 75 MMHG

## 2024-09-18 DIAGNOSIS — E11.9 TYPE 2 DIABETES MELLITUS WITHOUT COMPLICATION, WITHOUT LONG-TERM CURRENT USE OF INSULIN (HCC): ICD-10-CM

## 2024-09-18 DIAGNOSIS — K76.0 NAFLD (NONALCOHOLIC FATTY LIVER DISEASE): Primary | ICD-10-CM

## 2024-09-18 LAB
ALBUMIN SERPL-MCNC: 3.9 G/DL (ref 3.5–5)
ALBUMIN/GLOB SERPL: 1 (ref 1.1–2.2)
ALP SERPL-CCNC: 86 U/L (ref 45–117)
ALT SERPL-CCNC: 63 U/L (ref 12–78)
ANION GAP SERPL CALC-SCNC: 3 MMOL/L (ref 2–12)
AST SERPL-CCNC: 30 U/L (ref 15–37)
BASOPHILS # BLD: 0.1 K/UL (ref 0–0.1)
BASOPHILS NFR BLD: 1 % (ref 0–1)
BILIRUB DIRECT SERPL-MCNC: 0.1 MG/DL (ref 0–0.2)
BILIRUB SERPL-MCNC: 0.4 MG/DL (ref 0.2–1)
BUN SERPL-MCNC: 12 MG/DL (ref 6–20)
BUN/CREAT SERPL: 14 (ref 12–20)
CALCIUM SERPL-MCNC: 9.2 MG/DL (ref 8.5–10.1)
CHLORIDE SERPL-SCNC: 107 MMOL/L (ref 97–108)
CO2 SERPL-SCNC: 30 MMOL/L (ref 21–32)
CREAT SERPL-MCNC: 0.84 MG/DL (ref 0.7–1.3)
DIFFERENTIAL METHOD BLD: NORMAL
EOSINOPHIL # BLD: 0.3 K/UL (ref 0–0.4)
EOSINOPHIL NFR BLD: 5 % (ref 0–7)
ERYTHROCYTE [DISTWIDTH] IN BLOOD BY AUTOMATED COUNT: 12.3 % (ref 11.5–14.5)
GLOBULIN SER CALC-MCNC: 4 G/DL (ref 2–4)
GLUCOSE SERPL-MCNC: 64 MG/DL (ref 65–100)
HCT VFR BLD AUTO: 42.2 % (ref 36.6–50.3)
HGB BLD-MCNC: 14.3 G/DL (ref 12.1–17)
IMM GRANULOCYTES # BLD AUTO: 0 K/UL (ref 0–0.04)
IMM GRANULOCYTES NFR BLD AUTO: 0 % (ref 0–0.5)
LYMPHOCYTES # BLD: 2.7 K/UL (ref 0.8–3.5)
LYMPHOCYTES NFR BLD: 44 % (ref 12–49)
MCH RBC QN AUTO: 33.3 PG (ref 26–34)
MCHC RBC AUTO-ENTMCNC: 33.9 G/DL (ref 30–36.5)
MCV RBC AUTO: 98.1 FL (ref 80–99)
MONOCYTES # BLD: 0.6 K/UL (ref 0–1)
MONOCYTES NFR BLD: 11 % (ref 5–13)
NEUTS SEG # BLD: 2.3 K/UL (ref 1.8–8)
NEUTS SEG NFR BLD: 39 % (ref 32–75)
NRBC # BLD: 0 K/UL (ref 0–0.01)
NRBC BLD-RTO: 0 PER 100 WBC
PLATELET # BLD AUTO: 188 K/UL (ref 150–400)
PMV BLD AUTO: 10.8 FL (ref 8.9–12.9)
POTASSIUM SERPL-SCNC: 4.1 MMOL/L (ref 3.5–5.1)
PROT SERPL-MCNC: 7.9 G/DL (ref 6.4–8.2)
RBC # BLD AUTO: 4.3 M/UL (ref 4.1–5.7)
SODIUM SERPL-SCNC: 140 MMOL/L (ref 136–145)
WBC # BLD AUTO: 6 K/UL (ref 4.1–11.1)

## 2024-09-18 PROCEDURE — 99214 OFFICE O/P EST MOD 30 MIN: CPT | Performed by: NURSE PRACTITIONER

## 2024-09-18 PROCEDURE — 3044F HG A1C LEVEL LT 7.0%: CPT | Performed by: NURSE PRACTITIONER

## 2024-09-18 ASSESSMENT — ANXIETY QUESTIONNAIRES
4. TROUBLE RELAXING: NOT AT ALL
7. FEELING AFRAID AS IF SOMETHING AWFUL MIGHT HAPPEN: NOT AT ALL
2. NOT BEING ABLE TO STOP OR CONTROL WORRYING: NOT AT ALL
1. FEELING NERVOUS, ANXIOUS, OR ON EDGE: NOT AT ALL
5. BEING SO RESTLESS THAT IT IS HARD TO SIT STILL: NOT AT ALL
IF YOU CHECKED OFF ANY PROBLEMS ON THIS QUESTIONNAIRE, HOW DIFFICULT HAVE THESE PROBLEMS MADE IT FOR YOU TO DO YOUR WORK, TAKE CARE OF THINGS AT HOME, OR GET ALONG WITH OTHER PEOPLE: NOT DIFFICULT AT ALL
GAD7 TOTAL SCORE: 0
6. BECOMING EASILY ANNOYED OR IRRITABLE: NOT AT ALL
3. WORRYING TOO MUCH ABOUT DIFFERENT THINGS: NOT AT ALL

## 2024-09-18 ASSESSMENT — PATIENT HEALTH QUESTIONNAIRE - PHQ9
SUM OF ALL RESPONSES TO PHQ9 QUESTIONS 1 & 2: 0
SUM OF ALL RESPONSES TO PHQ QUESTIONS 1-9: 0
1. LITTLE INTEREST OR PLEASURE IN DOING THINGS: NOT AT ALL
SUM OF ALL RESPONSES TO PHQ QUESTIONS 1-9: 0
SUM OF ALL RESPONSES TO PHQ QUESTIONS 1-9: 0
DEPRESSION UNABLE TO ASSESS: FUNCTIONAL CAPACITY MOTIVATION LIMITS ACCURACY
SUM OF ALL RESPONSES TO PHQ QUESTIONS 1-9: 0
2. FEELING DOWN, DEPRESSED OR HOPELESS: NOT AT ALL

## 2024-10-13 DIAGNOSIS — E11.9 TYPE 2 DIABETES MELLITUS WITHOUT COMPLICATION, WITHOUT LONG-TERM CURRENT USE OF INSULIN (HCC): ICD-10-CM

## 2024-10-14 RX ORDER — SEMAGLUTIDE 1.34 MG/ML
1 INJECTION, SOLUTION SUBCUTANEOUS
Qty: 3 ML | Refills: 1 | Status: SHIPPED | OUTPATIENT
Start: 2024-10-14

## 2024-12-03 DIAGNOSIS — E11.9 WELL CONTROLLED DIABETES MELLITUS (HCC): ICD-10-CM

## 2025-01-22 ENCOUNTER — OFFICE VISIT (OUTPATIENT)
Age: 60
End: 2025-01-22
Payer: COMMERCIAL

## 2025-01-22 VITALS
DIASTOLIC BLOOD PRESSURE: 69 MMHG | TEMPERATURE: 97.7 F | WEIGHT: 285.4 LBS | BODY MASS INDEX: 37.83 KG/M2 | HEART RATE: 77 BPM | SYSTOLIC BLOOD PRESSURE: 109 MMHG | OXYGEN SATURATION: 98 % | HEIGHT: 73 IN

## 2025-01-22 DIAGNOSIS — K76.0 NAFLD (NONALCOHOLIC FATTY LIVER DISEASE): Primary | ICD-10-CM

## 2025-01-22 LAB
ALBUMIN SERPL-MCNC: 3.8 G/DL (ref 3.5–5)
ALBUMIN/GLOB SERPL: 0.9 (ref 1.1–2.2)
ALP SERPL-CCNC: 94 U/L (ref 45–117)
ALT SERPL-CCNC: 82 U/L (ref 12–78)
ANION GAP SERPL CALC-SCNC: 6 MMOL/L (ref 2–12)
AST SERPL-CCNC: 37 U/L (ref 15–37)
BASOPHILS # BLD: 0.07 K/UL (ref 0–0.1)
BASOPHILS NFR BLD: 1.1 % (ref 0–1)
BILIRUB SERPL-MCNC: 0.5 MG/DL (ref 0.2–1)
BUN SERPL-MCNC: 10 MG/DL (ref 6–20)
BUN/CREAT SERPL: 11 (ref 12–20)
CALCIUM SERPL-MCNC: 9.3 MG/DL (ref 8.5–10.1)
CHLORIDE SERPL-SCNC: 104 MMOL/L (ref 97–108)
CO2 SERPL-SCNC: 27 MMOL/L (ref 21–32)
CREAT SERPL-MCNC: 0.95 MG/DL (ref 0.7–1.3)
DIFFERENTIAL METHOD BLD: ABNORMAL
EOSINOPHIL # BLD: 0.37 K/UL (ref 0–0.4)
EOSINOPHIL NFR BLD: 5.8 % (ref 0–7)
ERYTHROCYTE [DISTWIDTH] IN BLOOD BY AUTOMATED COUNT: 12.1 % (ref 11.5–14.5)
GLOBULIN SER CALC-MCNC: 4.4 G/DL (ref 2–4)
GLUCOSE SERPL-MCNC: 130 MG/DL (ref 65–100)
HCT VFR BLD AUTO: 46 % (ref 36.6–50.3)
HGB BLD-MCNC: 15.4 G/DL (ref 12.1–17)
IMM GRANULOCYTES # BLD AUTO: 0.02 K/UL (ref 0–0.04)
IMM GRANULOCYTES NFR BLD AUTO: 0.3 % (ref 0–0.5)
LYMPHOCYTES # BLD: 2.76 K/UL (ref 0.8–3.5)
LYMPHOCYTES NFR BLD: 43 % (ref 12–49)
MCH RBC QN AUTO: 32.9 PG (ref 26–34)
MCHC RBC AUTO-ENTMCNC: 33.5 G/DL (ref 30–36.5)
MCV RBC AUTO: 98.3 FL (ref 80–99)
MONOCYTES # BLD: 0.59 K/UL (ref 0–1)
MONOCYTES NFR BLD: 9.2 % (ref 5–13)
NEUTS SEG # BLD: 2.61 K/UL (ref 1.8–8)
NEUTS SEG NFR BLD: 40.6 % (ref 32–75)
NRBC # BLD: 0 K/UL (ref 0–0.01)
NRBC BLD-RTO: 0 PER 100 WBC
PLATELET # BLD AUTO: 196 K/UL (ref 150–400)
PMV BLD AUTO: 11.1 FL (ref 8.9–12.9)
POTASSIUM SERPL-SCNC: 4.2 MMOL/L (ref 3.5–5.1)
PROT SERPL-MCNC: 8.2 G/DL (ref 6.4–8.2)
RBC # BLD AUTO: 4.68 M/UL (ref 4.1–5.7)
SODIUM SERPL-SCNC: 137 MMOL/L (ref 136–145)
WBC # BLD AUTO: 6.4 K/UL (ref 4.1–11.1)

## 2025-01-22 PROCEDURE — 99214 OFFICE O/P EST MOD 30 MIN: CPT | Performed by: NURSE PRACTITIONER

## 2025-01-22 PROCEDURE — 91200 LIVER ELASTOGRAPHY: CPT | Performed by: NURSE PRACTITIONER

## 2025-01-22 ASSESSMENT — PATIENT HEALTH QUESTIONNAIRE - PHQ9
SUM OF ALL RESPONSES TO PHQ9 QUESTIONS 1 & 2: 0
2. FEELING DOWN, DEPRESSED OR HOPELESS: NOT AT ALL
SUM OF ALL RESPONSES TO PHQ QUESTIONS 1-9: 0
1. LITTLE INTEREST OR PLEASURE IN DOING THINGS: NOT AT ALL
DEPRESSION UNABLE TO ASSESS: FUNCTIONAL CAPACITY MOTIVATION LIMITS ACCURACY
SUM OF ALL RESPONSES TO PHQ QUESTIONS 1-9: 0

## 2025-01-22 ASSESSMENT — ANXIETY QUESTIONNAIRES
IF YOU CHECKED OFF ANY PROBLEMS ON THIS QUESTIONNAIRE, HOW DIFFICULT HAVE THESE PROBLEMS MADE IT FOR YOU TO DO YOUR WORK, TAKE CARE OF THINGS AT HOME, OR GET ALONG WITH OTHER PEOPLE: NOT DIFFICULT AT ALL
2. NOT BEING ABLE TO STOP OR CONTROL WORRYING: NOT AT ALL
7. FEELING AFRAID AS IF SOMETHING AWFUL MIGHT HAPPEN: NOT AT ALL
4. TROUBLE RELAXING: NOT AT ALL
5. BEING SO RESTLESS THAT IT IS HARD TO SIT STILL: NOT AT ALL
GAD7 TOTAL SCORE: 0
1. FEELING NERVOUS, ANXIOUS, OR ON EDGE: NOT AT ALL
3. WORRYING TOO MUCH ABOUT DIFFERENT THINGS: NOT AT ALL
6. BECOMING EASILY ANNOYED OR IRRITABLE: NOT AT ALL

## 2025-01-22 NOTE — PROGRESS NOTES
[place] [facility]      Boy Chávez MD, FACP, FACG, FAASLD      JU Daniels-FELICIA Chen, [facility]   Megan Madrigalgoldycameron, [facility]   Melinda Sesar, [facility]  Gallo Smith, [facility]   Denise Hudson, [facility]   Sondra Aston, [facility]   [address], [address]   [place]  [address] [phone]   FAX: [phone]  [facility]   [address], [address]   [place]  [address] [phone]   FAX: [phone]       Patient Care Team:  Antonina Urbina MD as PCP - General  Antonina Urbina MD as PCP - Empaneled Provider    Patient Active Problem List   Diagnosis    Family history of heart disease    Allergic rhinitis    Obstructive sleep apnea hypopnea, mild    Severe obesity (BMI 35.0-39.9) with comorbidity    NAFLD (nonalcoholic fatty liver disease)       Horacio Carter is being seen at [facility] for management of suspected NAFL. The active problem list, all pertinent past medical history, medications, radiologic findings and laboratory findings related to the liver disorder were reviewed and discussed with the patient.      The patient is a 59 y.o.  male who was found to have elevated liver transaminases in 12/2018.      Serologic evaluation for markers of chronic liver disease were negative.     Since the last office visit the patient has not been making dietary changes but the A1C has improved. Weight has increased 11 lbs.     The patient reports no current symptoms of liver disease. He denies fatigue, pain in the right side over the liver, itching, or swelling of the abdomen. There are no functional limitation in completing activities of daily living.     ASSESSMENT AND PLAN:  NAFLD     The diagnosis is based upon imaging, Fiboscan CAP score, features of metabolic

## 2025-01-22 NOTE — PROGRESS NOTES
Chief Complaint   Patient presents with    Follow-up     fibroscan     Vitals:    01/22/25 1519   BP: 109/69   Site: Left Upper Arm   Position: Sitting   Pulse: 77   Temp: 97.7 °F (36.5 °C)   TempSrc: Temporal   SpO2: 98%   Weight: 129.5 kg (285 lb 6.4 oz)   Height: 1.854 m (6' 1\")     .  \"Have you been to the ER, urgent care clinic since your last visit?  Hospitalized since your last visit?\"    NO    “Have you seen or consulted any other health care providers outside of [facility] since your last visit?”    NO          Click Here for Release of Records Request

## 2025-02-10 ENCOUNTER — OFFICE VISIT (OUTPATIENT)
Dept: PRIMARY CARE CLINIC | Facility: CLINIC | Age: 60
End: 2025-02-10

## 2025-02-10 VITALS
HEART RATE: 65 BPM | DIASTOLIC BLOOD PRESSURE: 84 MMHG | WEIGHT: 288.8 LBS | BODY MASS INDEX: 38.28 KG/M2 | HEIGHT: 73 IN | OXYGEN SATURATION: 96 % | TEMPERATURE: 97.2 F | RESPIRATION RATE: 16 BRPM | SYSTOLIC BLOOD PRESSURE: 124 MMHG

## 2025-02-10 DIAGNOSIS — E11.9 TYPE 2 DIABETES MELLITUS WITHOUT COMPLICATION, WITHOUT LONG-TERM CURRENT USE OF INSULIN (HCC): Primary | ICD-10-CM

## 2025-02-10 DIAGNOSIS — E66.01 SEVERE OBESITY: ICD-10-CM

## 2025-02-10 DIAGNOSIS — Z23 ENCOUNTER FOR IMMUNIZATION: ICD-10-CM

## 2025-02-10 DIAGNOSIS — E11.9 TYPE 2 DIABETES MELLITUS WITHOUT COMPLICATION, WITHOUT LONG-TERM CURRENT USE OF INSULIN (HCC): ICD-10-CM

## 2025-02-10 DIAGNOSIS — K76.0 NAFLD (NONALCOHOLIC FATTY LIVER DISEASE): ICD-10-CM

## 2025-02-10 DIAGNOSIS — Z23 NEED FOR TETANUS BOOSTER: ICD-10-CM

## 2025-02-10 DIAGNOSIS — G47.30 SLEEP APNEA, UNSPECIFIED TYPE: ICD-10-CM

## 2025-02-10 LAB
CHOLEST SERPL-MCNC: 143 MG/DL
CREAT UR-MCNC: 46.9 MG/DL
EST. AVERAGE GLUCOSE BLD GHB EST-MCNC: 148 MG/DL
HBA1C MFR BLD: 6.8 % (ref 4–5.6)
HDLC SERPL-MCNC: 49 MG/DL
HDLC SERPL: 2.9 (ref 0–5)
LDLC SERPL CALC-MCNC: 79 MG/DL (ref 0–100)
MICROALBUMIN UR-MCNC: 1.24 MG/DL
MICROALBUMIN/CREAT UR-RTO: 26 MG/G (ref 0–30)
TRIGL SERPL-MCNC: 75 MG/DL
VLDLC SERPL CALC-MCNC: 15 MG/DL

## 2025-02-10 RX ORDER — TIRZEPATIDE 5 MG/.5ML
5 INJECTION, SOLUTION SUBCUTANEOUS WEEKLY
Qty: 2 ML | Refills: 0 | Status: SHIPPED | OUTPATIENT
Start: 2025-02-10 | End: 2025-03-12

## 2025-02-10 RX ORDER — UBIDECARENONE 75 MG
50 CAPSULE ORAL DAILY
COMMUNITY

## 2025-02-10 RX ORDER — OMEGA-3/DHA/EPA/FISH OIL 60 MG-90MG
CAPSULE ORAL
COMMUNITY

## 2025-02-10 SDOH — ECONOMIC STABILITY: FOOD INSECURITY: WITHIN THE PAST 12 MONTHS, YOU WORRIED THAT YOUR FOOD WOULD RUN OUT BEFORE YOU GOT MONEY TO BUY MORE.: NEVER TRUE

## 2025-02-10 SDOH — ECONOMIC STABILITY: TRANSPORTATION INSECURITY
IN THE PAST 12 MONTHS, HAS LACK OF TRANSPORTATION KEPT YOU FROM MEETINGS, WORK, OR FROM GETTING THINGS NEEDED FOR DAILY LIVING?: NO

## 2025-02-10 SDOH — ECONOMIC STABILITY: FOOD INSECURITY: WITHIN THE PAST 12 MONTHS, THE FOOD YOU BOUGHT JUST DIDN'T LAST AND YOU DIDN'T HAVE MONEY TO GET MORE.: NEVER TRUE

## 2025-02-10 SDOH — ECONOMIC STABILITY: INCOME INSECURITY: IN THE LAST 12 MONTHS, WAS THERE A TIME WHEN YOU WERE NOT ABLE TO PAY THE MORTGAGE OR RENT ON TIME?: NO

## 2025-02-10 SDOH — ECONOMIC STABILITY: TRANSPORTATION INSECURITY
IN THE PAST 12 MONTHS, HAS THE LACK OF TRANSPORTATION KEPT YOU FROM MEDICAL APPOINTMENTS OR FROM GETTING MEDICATIONS?: NO

## 2025-02-10 ASSESSMENT — ENCOUNTER SYMPTOMS
COUGH: 0
COLOR CHANGE: 0
SHORTNESS OF BREATH: 0
ABDOMINAL PAIN: 0
SORE THROAT: 0
DIARRHEA: 0
NAUSEA: 0

## 2025-02-10 ASSESSMENT — PATIENT HEALTH QUESTIONNAIRE - PHQ9
SUM OF ALL RESPONSES TO PHQ QUESTIONS 1-9: 0
2. FEELING DOWN, DEPRESSED OR HOPELESS: NOT AT ALL
SUM OF ALL RESPONSES TO PHQ QUESTIONS 1-9: 0
SUM OF ALL RESPONSES TO PHQ9 QUESTIONS 1 & 2: 0
1. LITTLE INTEREST OR PLEASURE IN DOING THINGS: NOT AT ALL
SUM OF ALL RESPONSES TO PHQ QUESTIONS 1-9: 0
SUM OF ALL RESPONSES TO PHQ QUESTIONS 1-9: 0

## 2025-02-10 NOTE — PROGRESS NOTES
Health Decision Maker has been checked with the patient      Patient has stated that the scribe can come in room  NP Baer is okay to come in   Chief Complaint   Patient presents with    Follow-up     Increase Ozempic    Weight Management       \"Have you been to the ER, urgent care clinic since your last visit?  Hospitalized since your last visit?\"    NO    “Have you seen or consulted any other health care providers outside of [facility] since your last visit?”    NO      Vitals:    02/10/25 0846   BP: 124/84   Site: Left Upper Arm   Pulse: 65   Resp: 16   Temp: 97.2 °F (36.2 °C)   SpO2: 96%   Weight: 131 kg (288 lb 12.8 oz)   Height: 1.854 m (6' 1\")      Depression: Not at risk (2/10/2025)    PHQ-2     PHQ-2 Score: 0            Click Here for Release of Records Request    Specialist patient sees:     Chart reviewed: immunizations are documented.   Immunization History   Administered Date(s) Administered    COVID-19, MODERNA BLUE border, Primary or Immunocompromised, (age 12y+), IM, 100 mcg/0.5mL 05/20/2021, 06/20/2021, 02/12/2022    Hep B, HEPLISAV-B, (age 18y+), IM, 0.5mL 08/29/2023    Influenza Virus Vaccine 10/26/2012, 10/12/2016, 02/12/2022, 12/17/2023    Influenza, FLUARIX, FLULAVAL, FLUZONE (age 6 mo+) and AFLURIA, (age 3 y+), Quadv PF, 0.5mL 10/05/2017, 10/27/2019, 12/06/2022    Influenza, FLUCELVAX, (age 6 mo+) IM, Trivalent PF, 0.5mL 02/10/2025    Influenza, FLUCELVAX, (age 6 mo+), MDCK, Quadv PF, 0.5mL 12/11/2018    Pneumococcal, PPSV23, PNEUMOVAX 23, (age 2y+), SC/IM, 0.5mL 12/08/2019    TDaP, ADACEL (age 10y-64y), BOOSTRIX (age 10y+), IM, 0.5mL 05/01/2013    Zoster Recombinant (Shingrix) 04/11/2023, 06/27/2023        Patient provided with most updated VIS prior to administration. Opportunity given for questions and concerns provided. No concerns at this time    Immunizations administered to patient 2/10/2025 by Naty Rico LPN with consent.Patient tolerated procedure well. No 
is not diaphoretic.   HENT:      Right Ear: External ear normal.      Left Ear: External ear normal.   Eyes:      General: No scleral icterus.        Right eye: No discharge.         Left eye: No discharge.      Extraocular Movements: Extraocular movements intact.      Conjunctiva/sclera: Conjunctivae normal.   Cardiovascular:      Rate and Rhythm: Normal rate and regular rhythm.   Pulmonary:      Effort: Pulmonary effort is normal.      Breath sounds: Normal breath sounds. No wheezing.   Abdominal:      General: Bowel sounds are normal.      Palpations: Abdomen is soft.      Tenderness: There is no abdominal tenderness.   Musculoskeletal:      Cervical back: Normal range of motion and neck supple.   Lymphadenopathy:      Cervical: No cervical adenopathy.   Neurological:      Mental Status: He is alert and oriented to person, place, and time.   Psychiatric:         Mood and Affect: Mood normal.         ASSESSMENT/ PLAN   Below is the assessment and plan developed based on review of pertinent history, physical exam, labs, studies, and medications.      Horacio was seen today for follow-up and weight management.    Diagnoses and all orders for this visit:    Type 2 diabetes mellitus without complication, without long-term current use of insulin (HCC)  -     Hemoglobin A1C; Future  -     Lipid Panel; Future  -     Albumin/Creatinine Ratio, Urine; Future  -     Tirzepatide (MOUNJARO) 5 MG/0.5ML SOAJ; Inject 5 mg into the skin once a week.  I ordered blood work for the patient to obtain, I also discontinued Ozempic 1mg and  started Mounjaro 5mg/0.5ml    NAFLD (nonalcoholic fatty liver disease)  I encouraged the patient to continue to follow up with hepatologist routinely scheduled.    Encounter for immunization  -     Influenza, FLUCELVAX Trivalent, (age 6 mo+) IM, Preservative Free, 0.5mL  Patient received Flucelvax in the office today for preventative treatment.    Severe obesity  I encourage exercise and weight loss

## 2025-02-27 DIAGNOSIS — E11.9 WELL CONTROLLED DIABETES MELLITUS (HCC): ICD-10-CM

## 2025-03-09 DIAGNOSIS — E11.9 TYPE 2 DIABETES MELLITUS WITHOUT COMPLICATION, WITHOUT LONG-TERM CURRENT USE OF INSULIN (HCC): ICD-10-CM

## 2025-03-09 RX ORDER — TIRZEPATIDE 5 MG/.5ML
5 INJECTION, SOLUTION SUBCUTANEOUS WEEKLY
Qty: 2 ML | Refills: 0 | Status: SHIPPED | OUTPATIENT
Start: 2025-03-09 | End: 2025-04-08

## 2025-04-04 DIAGNOSIS — E11.9 TYPE 2 DIABETES MELLITUS WITHOUT COMPLICATION, WITHOUT LONG-TERM CURRENT USE OF INSULIN: ICD-10-CM

## 2025-04-04 RX ORDER — TIRZEPATIDE 5 MG/.5ML
5 INJECTION, SOLUTION SUBCUTANEOUS WEEKLY
Qty: 2 ML | Refills: 0 | Status: SHIPPED | OUTPATIENT
Start: 2025-04-04 | End: 2025-05-04

## 2025-04-29 DIAGNOSIS — E11.9 TYPE 2 DIABETES MELLITUS WITHOUT COMPLICATION, WITHOUT LONG-TERM CURRENT USE OF INSULIN (HCC): ICD-10-CM

## 2025-04-30 RX ORDER — TIRZEPATIDE 5 MG/.5ML
5 INJECTION, SOLUTION SUBCUTANEOUS WEEKLY
Qty: 2 ML | Refills: 0 | Status: SHIPPED | OUTPATIENT
Start: 2025-04-30 | End: 2025-05-02 | Stop reason: DRUGHIGH

## 2025-05-02 ENCOUNTER — OFFICE VISIT (OUTPATIENT)
Dept: PRIMARY CARE CLINIC | Facility: CLINIC | Age: 60
End: 2025-05-02
Payer: COMMERCIAL

## 2025-05-02 VITALS
DIASTOLIC BLOOD PRESSURE: 67 MMHG | TEMPERATURE: 97 F | WEIGHT: 272 LBS | HEART RATE: 64 BPM | RESPIRATION RATE: 18 BRPM | BODY MASS INDEX: 36.05 KG/M2 | OXYGEN SATURATION: 96 % | SYSTOLIC BLOOD PRESSURE: 110 MMHG | HEIGHT: 73 IN

## 2025-05-02 DIAGNOSIS — Z23 NEED FOR TETANUS BOOSTER: ICD-10-CM

## 2025-05-02 DIAGNOSIS — E11.9 TYPE 2 DIABETES MELLITUS WITHOUT COMPLICATION, WITHOUT LONG-TERM CURRENT USE OF INSULIN (HCC): Primary | ICD-10-CM

## 2025-05-02 DIAGNOSIS — E66.9 OBESITY (BMI 30-39.9): ICD-10-CM

## 2025-05-02 DIAGNOSIS — B35.1 ONYCHOMYCOSIS: ICD-10-CM

## 2025-05-02 PROCEDURE — 99214 OFFICE O/P EST MOD 30 MIN: CPT | Performed by: INTERNAL MEDICINE

## 2025-05-02 PROCEDURE — 3044F HG A1C LEVEL LT 7.0%: CPT | Performed by: INTERNAL MEDICINE

## 2025-05-02 ASSESSMENT — PATIENT HEALTH QUESTIONNAIRE - PHQ9
SUM OF ALL RESPONSES TO PHQ QUESTIONS 1-9: 0
2. FEELING DOWN, DEPRESSED OR HOPELESS: NOT AT ALL
SUM OF ALL RESPONSES TO PHQ QUESTIONS 1-9: 0
1. LITTLE INTEREST OR PLEASURE IN DOING THINGS: NOT AT ALL

## 2025-05-02 ASSESSMENT — ENCOUNTER SYMPTOMS
CHEST TIGHTNESS: 0
BACK PAIN: 0
EYE DISCHARGE: 0
RHINORRHEA: 0
SORE THROAT: 0
CONSTIPATION: 0
SHORTNESS OF BREATH: 0
COUGH: 0
COLOR CHANGE: 0
ABDOMINAL PAIN: 0
DIARRHEA: 0

## 2025-05-02 NOTE — PROGRESS NOTES
Horacio Carter (:  1965) is a 59 y.o. male, Established patient, here for evaluation of the following chief complaint(s):  Follow-up (On diabetes. ) and Stye (Right eye.)      ASSESSMENT/PLAN:  1. Type 2 diabetes mellitus without complication, without long-term current use of insulin ([facility])  -      DIABETES FOOT EXAM  -     Tirzepatide (MOUNJARO) 7.5 MG/0.5ML SOAJ pen; Inject 7.5 mg into the skin every 7 days, Disp-2 mL, R-1Normal. sent to pharmacy.  I performed a diabetic foot exam. I increased Mounjaro dosage to 7.5 mg/0.5 mL to be used weekly. Potential side effects were discussed. Follow-up in 3 months.     2. Obesity (BMI 30-39.9)  I explained that 5,000 steps are needed daily for healthy lifestyle and to maintain conditioning, and he will need to increase to 10,000 a day to work on weight loss.     3. Onychomycosis  -     Efinaconazole 10 % SOLN; Apply daily, Disp-8 mL, R-0Normal. sent to pharmacy.  -     AFL - Yudi Garrett DPM, Podiatry, [place] ([address])  I prescribed efinaconazole 10% solution to be applied daily to the affected nail. He should monitor the condition for improvement. I referred patient to Dr. Garrett in podiatry for further evaluation.     4. Need for tetanus booster  -     Tetanus-Diphth-Acell Pertussis (BOOSTRIX) 5-2.5-18.5 LF-MCG/0.5 injection; Inject 0.5 mLs into the muscle once for 1 dose, Disp-0.5 mL, R-0Normal. sent to pharmacy.  I sent Tdap vaccine to the pharmacy.              Subjective   SUBJECTIVE/OBJECTIVE:  HPI    Patient presents today for a diabetic follow-up.    He is currently on Mounjaro 5 mg/0.5 mL. He says it is very effective in controlling his appetite and keeping him full longer. He has also been exercising more.  He has lost 16 lbs since his last visit.     He takes metformin 500 mg BID daily.     He has a fungal infection on his right big toenail. He has been using Penlac 8% solution on the affected area but the discoloration is still present and is

## 2025-05-02 NOTE — PROGRESS NOTES
Have you been to the ER, urgent care clinic since your last visit?  Hospitalized since your last visit?   NO      Have you seen or consulted any other health care providers outside our system since your last visit?   NO      Chief Complaint   Patient presents with    Follow-up     On diabetes.     Stye     Right eye.       Pt is ok with scribe.

## 2025-05-10 DIAGNOSIS — B35.1 ONYCHOMYCOSIS: ICD-10-CM

## 2025-05-29 DIAGNOSIS — E11.9 TYPE 2 DIABETES MELLITUS WITHOUT COMPLICATION, WITHOUT LONG-TERM CURRENT USE OF INSULIN (HCC): ICD-10-CM

## 2025-06-03 DIAGNOSIS — E11.9 WELL CONTROLLED DIABETES MELLITUS (HCC): ICD-10-CM

## 2025-06-03 NOTE — TELEPHONE ENCOUNTER
Requested Prescriptions     Pending Prescriptions Disp Refills    metFORMIN (GLUCOPHAGE) 500 MG tablet [Pharmacy Med Name: METFORMIN  MG TABLET] 180 tablet 0     Sig: TAKE 1 TABLET BY MOUTH TWICE A DAY WITH FOOD        Last Visit  Last Refill 2/27/25

## 2025-06-26 DIAGNOSIS — E11.9 TYPE 2 DIABETES MELLITUS WITHOUT COMPLICATION, WITHOUT LONG-TERM CURRENT USE OF INSULIN (HCC): ICD-10-CM

## 2025-07-07 ENCOUNTER — OFFICE VISIT (OUTPATIENT)
Dept: PRIMARY CARE CLINIC | Facility: CLINIC | Age: 60
End: 2025-07-07
Payer: COMMERCIAL

## 2025-07-07 VITALS
OXYGEN SATURATION: 97 % | BODY MASS INDEX: 36.31 KG/M2 | TEMPERATURE: 97 F | SYSTOLIC BLOOD PRESSURE: 123 MMHG | HEART RATE: 61 BPM | WEIGHT: 274 LBS | RESPIRATION RATE: 20 BRPM | HEIGHT: 73 IN | DIASTOLIC BLOOD PRESSURE: 66 MMHG

## 2025-07-07 DIAGNOSIS — E11.9 TYPE 2 DIABETES MELLITUS WITHOUT COMPLICATION, WITHOUT LONG-TERM CURRENT USE OF INSULIN (HCC): Primary | ICD-10-CM

## 2025-07-07 DIAGNOSIS — E66.01 SEVERE OBESITY (HCC): ICD-10-CM

## 2025-07-07 DIAGNOSIS — I83.893 VARICOSE VEINS OF BILATERAL LOWER EXTREMITIES WITH OTHER COMPLICATIONS: ICD-10-CM

## 2025-07-07 PROCEDURE — 99214 OFFICE O/P EST MOD 30 MIN: CPT | Performed by: INTERNAL MEDICINE

## 2025-07-07 PROCEDURE — 3044F HG A1C LEVEL LT 7.0%: CPT | Performed by: INTERNAL MEDICINE

## 2025-07-07 ASSESSMENT — ENCOUNTER SYMPTOMS
ABDOMINAL PAIN: 0
BACK PAIN: 0
RHINORRHEA: 0
DIARRHEA: 0
CONSTIPATION: 0
SORE THROAT: 0
SHORTNESS OF BREATH: 0
COUGH: 0
COLOR CHANGE: 0
EYE DISCHARGE: 0
CHEST TIGHTNESS: 0

## 2025-07-07 ASSESSMENT — PATIENT HEALTH QUESTIONNAIRE - PHQ9
SUM OF ALL RESPONSES TO PHQ QUESTIONS 1-9: 0
1. LITTLE INTEREST OR PLEASURE IN DOING THINGS: NOT AT ALL
2. FEELING DOWN, DEPRESSED OR HOPELESS: NOT AT ALL
SUM OF ALL RESPONSES TO PHQ QUESTIONS 1-9: 0

## 2025-07-07 NOTE — PROGRESS NOTES
Have you been to the ER, urgent care clinic since your last visit?  Hospitalized since your last visit?   NO      Have you seen or consulted any other health care providers outside our system since your last visit?   NO      Chief Complaint   Patient presents with    Discuss varicose veins     Treatments        Pt is ok with scribe.   
Transportation (Medical): No    • Lack of Transportation (Non-Medical): No   Physical Activity: Not on file   Stress: Not on file   Social Connections: Not on file   Intimate Partner Violence: Not on file   Housing Stability: Low Risk  (2/10/2025)    Housing Stability Vital Sign    • Unable to Pay for Housing in the Last Year: No    • Number of Times Moved in the Last Year: 0    • Homeless in the Last Year: No       No visits with results within 3 Month(s) from this visit.   Latest known visit with results is:   Orders Only on 02/10/2025   Component Date Value Ref Range Status   • Albumin Urine 02/10/2025 1.24  MG/DL Final    No reference range has been established.   • Creatinine, Ur 02/10/2025 46.90  mg/dL Final    No reference range has been established.   • Albumin/Creatinine Ratio 02/10/2025 26  0 - 30 mg/g Final   • Cholesterol, Total 02/10/2025 143  <200 MG/DL Final   • Triglycerides 02/10/2025 75  <150 MG/DL Final    Based on NCEP-ATP III:  Triglycerides <150 mg/dL  is considered normal, 150-199 mg/dL  borderline high,  200-499 mg/dL high and  greater than or equal to 500 mg/dL very high.   • HDL 02/10/2025 49  MG/DL Final    Based on NCEP ATP III, HDL Cholesterol <40 mg/dL is considered low and >60 mg/dL is elevated.   • LDL Cholesterol 02/10/2025 79  0 - 100 MG/DL Final    Comment: Based on the NCEP-ATP: LDL-C concentrations are considered  optimal <100 mg/dL,  near optimal/above Normal 100-129 mg/dL  Borderline High: 130-159, High: 160-189 mg/dL  Very High: Greater than or equal to 190 mg/dL     • VLDL Cholesterol Calculated 02/10/2025 15  MG/DL Final   • Chol/HDL Ratio 02/10/2025 2.9  0.0 - 5.0   Final   • Hemoglobin A1C 02/10/2025 6.8 (H)  4.0 - 5.6 % Final    Comment: (NOTE)  HbA1C Interpretive Ranges  <5.7              Normal  5.7 - 6.4         Consider Prediabetes  >6.5              Consider Diabetes     • Estimated Avg Glucose 02/10/2025 148  mg/dL Final         Review of Systems   Constitutional:

## 2025-07-10 DIAGNOSIS — B35.1 ONYCHOMYCOSIS: ICD-10-CM

## 2025-07-28 ENCOUNTER — OFFICE VISIT (OUTPATIENT)
Age: 60
End: 2025-07-28
Payer: COMMERCIAL

## 2025-07-28 VITALS
TEMPERATURE: 96.8 F | DIASTOLIC BLOOD PRESSURE: 76 MMHG | OXYGEN SATURATION: 97 % | SYSTOLIC BLOOD PRESSURE: 122 MMHG | HEART RATE: 76 BPM | BODY MASS INDEX: 35.44 KG/M2 | WEIGHT: 267.4 LBS | HEIGHT: 73 IN

## 2025-07-28 DIAGNOSIS — K76.0 NAFLD (NONALCOHOLIC FATTY LIVER DISEASE): Primary | ICD-10-CM

## 2025-07-28 LAB
BASOPHILS # BLD AUTO: 0.1 X10E3/UL (ref 0–0.2)
BASOPHILS NFR BLD AUTO: 1 %
EOSINOPHIL # BLD AUTO: 0.4 X10E3/UL (ref 0–0.4)
EOSINOPHIL NFR BLD AUTO: 6 %
ERYTHROCYTE [DISTWIDTH] IN BLOOD BY AUTOMATED COUNT: 12.9 % (ref 11.6–15.4)
HCT VFR BLD AUTO: 44 % (ref 37.5–51)
HGB BLD-MCNC: 15.1 G/DL (ref 13–17.7)
IMM GRANULOCYTES # BLD AUTO: 0 X10E3/UL (ref 0–0.1)
IMM GRANULOCYTES NFR BLD AUTO: 0 %
LYMPHOCYTES # BLD AUTO: 2.2 X10E3/UL (ref 0.7–3.1)
LYMPHOCYTES NFR BLD AUTO: 39 %
MCH RBC QN AUTO: 33.6 PG (ref 26.6–33)
MCHC RBC AUTO-ENTMCNC: 34.3 G/DL (ref 31.5–35.7)
MCV RBC AUTO: 98 FL (ref 79–97)
MONOCYTES # BLD AUTO: 0.5 X10E3/UL (ref 0.1–0.9)
MONOCYTES NFR BLD AUTO: 9 %
NEUTROPHILS # BLD AUTO: 2.5 X10E3/UL (ref 1.4–7)
NEUTROPHILS NFR BLD AUTO: 45 %
PLATELET # BLD AUTO: 201 X10E3/UL (ref 150–450)
RBC # BLD AUTO: 4.5 X10E6/UL (ref 4.14–5.8)
WBC # BLD AUTO: 5.6 X10E3/UL (ref 3.4–10.8)

## 2025-07-28 PROCEDURE — 99213 OFFICE O/P EST LOW 20 MIN: CPT | Performed by: NURSE PRACTITIONER

## 2025-07-28 ASSESSMENT — PATIENT HEALTH QUESTIONNAIRE - PHQ9
2. FEELING DOWN, DEPRESSED OR HOPELESS: NOT AT ALL
SUM OF ALL RESPONSES TO PHQ QUESTIONS 1-9: 0
DEPRESSION UNABLE TO ASSESS: FUNCTIONAL CAPACITY MOTIVATION LIMITS ACCURACY
1. LITTLE INTEREST OR PLEASURE IN DOING THINGS: NOT AT ALL

## 2025-07-28 NOTE — PROGRESS NOTES
[facility]      Boy Chávez MD, FACP, FACG, FAASLD      MADHURI Daniels, [facility]   Megan Madrigalgoldycameron, [facility]   Melinda Sesar, [facility]  Gallo Smith, [facility]   Denise Hudson, [facility]   Sondra Aston, [facility]   [address], [address]   [place]  [address] [phone]   FAX: [phone]  [facility]   [address], [address]   [place]  [address] [phone]   FAX: [phone]       Patient Care Team:  Antonina Urbina MD as PCP - General  Antonina Urbina MD as PCP - Empaneled Provider    Patient Active Problem List   Diagnosis    Family history of heart disease    Allergic rhinitis    Obstructive sleep apnea hypopnea, mild    Severe obesity (BMI 35.0-39.9) with comorbidity (HCC)    NAFLD (nonalcoholic fatty liver disease)       Horacio Carter is being seen at [facility] for management of suspected NAFL. The active problem list, all pertinent past medical history, medications, radiologic findings and laboratory findings related to the liver disorder were reviewed and discussed with the patient.      The patient is a 59 y.o.  male who was found to have elevated liver transaminases in 12/2018.      Since the last office visit the patient was started on Mounjaro and weight is down 21 lbs. He feels well overall with no new complications of liver disease.     The patient reports no current symptoms of liver disease. He denies fatigue, pain in the right side over the liver, itching, or swelling of the abdomen. There are no functional limitation in completing activities of daily living.     ASSESSMENT AND PLAN:  NAFLD     The diagnosis is based upon imaging, Fiboscan CAP score, features of metabolic syndrome, and   serologic studies that are

## 2025-07-28 NOTE — PROGRESS NOTES
No chief complaint on file.    Vitals:    07/28/25 1242   BP: 122/76   BP Site: Left Upper Arm   Patient Position: Sitting   Pulse: 76   Temp: 96.8 °F (36 °C)   TempSrc: Temporal   SpO2: 97%   Weight: 121.3 kg (267 lb 6.4 oz)   Height: 1.854 m (6' 1\")     .  \"Have you been to the ER, urgent care clinic since your last visit?  Hospitalized since your last visit?\"    NO    “Have you seen or consulted any other health care providers outside of [facility] since your last visit?”    NO          Click Here for Release of Records Request

## 2025-07-29 LAB
ALBUMIN SERPL-MCNC: 4.5 G/DL (ref 3.8–4.9)
ALP SERPL-CCNC: 85 IU/L (ref 44–121)
ALT SERPL-CCNC: 25 IU/L (ref 0–44)
AST SERPL-CCNC: 26 IU/L (ref 0–40)
BILIRUB SERPL-MCNC: 0.6 MG/DL (ref 0–1.2)
BUN SERPL-MCNC: 17 MG/DL (ref 8–27)
BUN/CREAT SERPL: 19 (ref 10–24)
CALCIUM SERPL-MCNC: 9.6 MG/DL (ref 8.6–10.2)
CHLORIDE SERPL-SCNC: 105 MMOL/L (ref 96–106)
CO2 SERPL-SCNC: 19 MMOL/L (ref 20–29)
CREAT SERPL-MCNC: 0.91 MG/DL (ref 0.76–1.27)
EGFRCR SERPLBLD CKD-EPI 2021: 96 ML/MIN/1.73
GLOBULIN SER CALC-MCNC: 3.4 G/DL (ref 1.5–4.5)
GLUCOSE SERPL-MCNC: 83 MG/DL (ref 70–99)
POTASSIUM SERPL-SCNC: 4.5 MMOL/L (ref 3.5–5.2)
PROT SERPL-MCNC: 7.9 G/DL (ref 6–8.5)
SODIUM SERPL-SCNC: 140 MMOL/L (ref 134–144)

## 2025-08-07 DIAGNOSIS — E11.9 TYPE 2 DIABETES MELLITUS WITHOUT COMPLICATION, WITHOUT LONG-TERM CURRENT USE OF INSULIN (HCC): ICD-10-CM

## 2025-08-21 DIAGNOSIS — B35.1 ONYCHOMYCOSIS: ICD-10-CM

## 2025-08-30 DIAGNOSIS — E11.9 WELL CONTROLLED DIABETES MELLITUS (HCC): ICD-10-CM
